# Patient Record
Sex: FEMALE | Race: WHITE
[De-identification: names, ages, dates, MRNs, and addresses within clinical notes are randomized per-mention and may not be internally consistent; named-entity substitution may affect disease eponyms.]

---

## 2017-06-07 ENCOUNTER — HOSPITAL ENCOUNTER (OUTPATIENT)
Dept: HOSPITAL 75 - RAD | Age: 82
End: 2017-06-07
Attending: PHYSICIAN ASSISTANT
Payer: MEDICARE

## 2017-06-07 DIAGNOSIS — Z12.31: Primary | ICD-10-CM

## 2017-06-07 PROCEDURE — 77067 SCR MAMMO BI INCL CAD: CPT

## 2017-06-07 NOTE — DIAGNOSTIC IMAGING REPORT
Bilateral screening mammogram



The current study was also evaluated with a Computer Aided

Detection (CAD) system.



Indication: Screening. No current complaints stated on the

questionnaire.



COMPARISON: 4/7/16



FINDINGS:

The breasts are composed of scattered fibroglandular densities.

There are  scattered benign-appearing calcifications. Allowing

for technique and positional differences, no suspicious change is

seen.



IMPRESSION: No significant change.



ACR BI-RADS Category 2: Benign findings.

Result letter will be mailed to the patient.

Note: At least 10% of breast cancer is not imaged by mammography.





Dictated by: 



  Dictated on workstation # [id number]

## 2018-08-27 ENCOUNTER — HOSPITAL ENCOUNTER (INPATIENT)
Dept: HOSPITAL 75 - ER | Age: 83
LOS: 9 days | DRG: 871 | End: 2018-09-05
Attending: FAMILY MEDICINE | Admitting: FAMILY MEDICINE
Payer: MEDICARE

## 2018-08-27 VITALS — SYSTOLIC BLOOD PRESSURE: 138 MMHG | DIASTOLIC BLOOD PRESSURE: 82 MMHG

## 2018-08-27 VITALS — DIASTOLIC BLOOD PRESSURE: 93 MMHG | SYSTOLIC BLOOD PRESSURE: 143 MMHG

## 2018-08-27 VITALS — SYSTOLIC BLOOD PRESSURE: 141 MMHG | DIASTOLIC BLOOD PRESSURE: 72 MMHG

## 2018-08-27 VITALS — HEIGHT: 65 IN | WEIGHT: 148 LBS | BODY MASS INDEX: 24.66 KG/M2

## 2018-08-27 VITALS — SYSTOLIC BLOOD PRESSURE: 144 MMHG | DIASTOLIC BLOOD PRESSURE: 74 MMHG

## 2018-08-27 VITALS — DIASTOLIC BLOOD PRESSURE: 60 MMHG | SYSTOLIC BLOOD PRESSURE: 145 MMHG

## 2018-08-27 VITALS — DIASTOLIC BLOOD PRESSURE: 69 MMHG | SYSTOLIC BLOOD PRESSURE: 128 MMHG

## 2018-08-27 VITALS — SYSTOLIC BLOOD PRESSURE: 133 MMHG | DIASTOLIC BLOOD PRESSURE: 63 MMHG

## 2018-08-27 VITALS — SYSTOLIC BLOOD PRESSURE: 137 MMHG | DIASTOLIC BLOOD PRESSURE: 81 MMHG

## 2018-08-27 VITALS — SYSTOLIC BLOOD PRESSURE: 135 MMHG | DIASTOLIC BLOOD PRESSURE: 72 MMHG

## 2018-08-27 VITALS — SYSTOLIC BLOOD PRESSURE: 140 MMHG | DIASTOLIC BLOOD PRESSURE: 70 MMHG

## 2018-08-27 VITALS — DIASTOLIC BLOOD PRESSURE: 72 MMHG | SYSTOLIC BLOOD PRESSURE: 128 MMHG

## 2018-08-27 VITALS — SYSTOLIC BLOOD PRESSURE: 135 MMHG | DIASTOLIC BLOOD PRESSURE: 67 MMHG

## 2018-08-27 DIAGNOSIS — D63.8: ICD-10-CM

## 2018-08-27 DIAGNOSIS — J81.1: ICD-10-CM

## 2018-08-27 DIAGNOSIS — Z66: ICD-10-CM

## 2018-08-27 DIAGNOSIS — E83.42: ICD-10-CM
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
## 2018-08-27 DIAGNOSIS — J18.9: ICD-10-CM

## 2018-08-27 DIAGNOSIS — L97.229: ICD-10-CM

## 2018-08-27 DIAGNOSIS — R60.0: ICD-10-CM

## 2018-08-27 DIAGNOSIS — R91.8: ICD-10-CM

## 2018-08-27 DIAGNOSIS — L89.159: ICD-10-CM

## 2018-08-27 DIAGNOSIS — R17: ICD-10-CM

## 2018-08-27 DIAGNOSIS — E11.9: ICD-10-CM
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
## 2018-08-27 DIAGNOSIS — I87.2: ICD-10-CM

## 2018-08-27 DIAGNOSIS — A41.1: Primary | ICD-10-CM

## 2018-08-27 DIAGNOSIS — J96.90: ICD-10-CM

## 2018-08-27 DIAGNOSIS — R65.21: ICD-10-CM

## 2018-08-27 DIAGNOSIS — Z86.69: ICD-10-CM

## 2018-08-27 DIAGNOSIS — R15.9: ICD-10-CM

## 2018-08-27 DIAGNOSIS — E43: ICD-10-CM

## 2018-08-27 DIAGNOSIS — S51.812A: ICD-10-CM

## 2018-08-27 DIAGNOSIS — D50.9: ICD-10-CM

## 2018-08-27 DIAGNOSIS — S81.802A: ICD-10-CM

## 2018-08-27 DIAGNOSIS — I10: ICD-10-CM

## 2018-08-27 DIAGNOSIS — R79.1: ICD-10-CM

## 2018-08-27 DIAGNOSIS — M54.5: ICD-10-CM

## 2018-08-27 DIAGNOSIS — I26.99: ICD-10-CM

## 2018-08-27 DIAGNOSIS — Z51.5: ICD-10-CM

## 2018-08-27 DIAGNOSIS — R32: ICD-10-CM

## 2018-08-27 DIAGNOSIS — E87.6: ICD-10-CM

## 2018-08-27 LAB
%HYPO/RBC NFR BLD AUTO: (no result) %
ALBUMIN SERPL-MCNC: 1.7 GM/DL (ref 3.2–4.5)
ALBUMIN SERPL-MCNC: 1.8 GM/DL (ref 3.2–4.5)
ALP SERPL-CCNC: 73 U/L (ref 40–136)
ALP SERPL-CCNC: 84 U/L (ref 40–136)
ALT SERPL-CCNC: 6 U/L (ref 0–55)
ALT SERPL-CCNC: 8 U/L (ref 0–55)
ANISOCYTOSIS BLD QL SMEAR: (no result)
APTT BLD: 27 SEC (ref 24–35)
APTT PPP: YELLOW S
BACTERIA #/AREA URNS HPF: NEGATIVE /HPF
BASOPHILS # BLD AUTO: 0 10^3/UL (ref 0–0.1)
BASOPHILS NFR BLD AUTO: 0 % (ref 0–10)
BASOPHILS NFR BLD MANUAL: 0 %
BILIRUB SERPL-MCNC: 1.4 MG/DL (ref 0.1–1)
BILIRUB SERPL-MCNC: 1.5 MG/DL (ref 0.1–1)
BILIRUB UR QL STRIP: (no result)
BUN/CREAT SERPL: 18
BUN/CREAT SERPL: 20
CALCIUM SERPL-MCNC: 6.9 MG/DL (ref 8.5–10.1)
CALCIUM SERPL-MCNC: 7.8 MG/DL (ref 8.5–10.1)
CHLORIDE SERPL-SCNC: 102 MMOL/L (ref 98–107)
CHLORIDE SERPL-SCNC: 97 MMOL/L (ref 98–107)
CO2 SERPL-SCNC: 34 MMOL/L (ref 21–32)
CO2 SERPL-SCNC: 34 MMOL/L (ref 21–32)
CREAT SERPL-MCNC: 0.79 MG/DL (ref 0.6–1.3)
CREAT SERPL-MCNC: 0.96 MG/DL (ref 0.6–1.3)
EOSINOPHIL # BLD AUTO: 0 10^3/UL (ref 0–0.3)
EOSINOPHIL NFR BLD AUTO: 0 % (ref 0–10)
EOSINOPHIL NFR BLD MANUAL: 1 %
ERYTHROCYTE [DISTWIDTH] IN BLOOD BY AUTOMATED COUNT: 19.4 % (ref 10–14.5)
FIBRINOGEN PPP-MCNC: (no result) MG/DL
GFR SERPLBLD BASED ON 1.73 SQ M-ARVRAT: 56 ML/MIN
GFR SERPLBLD BASED ON 1.73 SQ M-ARVRAT: > 60 ML/MIN
GLUCOSE SERPL-MCNC: 179 MG/DL (ref 70–105)
GLUCOSE SERPL-MCNC: 189 MG/DL (ref 70–105)
GLUCOSE UR STRIP-MCNC: NEGATIVE MG/DL
HCT VFR BLD CALC: 29 % (ref 35–52)
HGB BLD-MCNC: 8.1 G/DL (ref 11.5–16)
INR PPP: 1.7 (ref 0.8–1.4)
KETONES UR QL STRIP: (no result)
LEUKOCYTE ESTERASE UR QL STRIP: (no result)
LYMPHOCYTES # BLD AUTO: 0.7 X 10^3 (ref 1–4)
LYMPHOCYTES NFR BLD AUTO: 5 % (ref 12–44)
MAGNESIUM SERPL-MCNC: 1.1 MG/DL (ref 1.8–2.4)
MANUAL DIFFERENTIAL PERFORMED BLD QL: YES
MCH RBC QN AUTO: 23 PG (ref 25–34)
MCHC RBC AUTO-ENTMCNC: 28 G/DL (ref 32–36)
MCV RBC AUTO: 82 FL (ref 80–99)
MONOCYTES # BLD AUTO: 0.8 X 10^3 (ref 0–1)
MONOCYTES NFR BLD AUTO: 5 % (ref 0–12)
MONOCYTES NFR BLD: 2 %
NEUTROPHILS # BLD AUTO: 12.7 X 10^3 (ref 1.8–7.8)
NEUTROPHILS NFR BLD AUTO: 90 % (ref 42–75)
NEUTS BAND NFR BLD MANUAL: 90 %
NEUTS BAND NFR BLD: 3 %
NITRITE UR QL STRIP: NEGATIVE
PH UR STRIP: 6 [PH] (ref 5–9)
PLATELET # BLD: 382 10^3/UL (ref 130–400)
PMV BLD AUTO: 9.1 FL (ref 7.4–10.4)
POTASSIUM SERPL-SCNC: 2 MMOL/L (ref 3.6–5)
POTASSIUM SERPL-SCNC: 2.3 MMOL/L (ref 3.6–5)
PROT SERPL-MCNC: 5 GM/DL (ref 6.4–8.2)
PROT SERPL-MCNC: 5.6 GM/DL (ref 6.4–8.2)
PROT UR QL STRIP: (no result)
PROTHROMBIN TIME: 19.8 SEC (ref 12.2–14.7)
RBC # BLD AUTO: 3.48 10^6/UL (ref 4.35–5.85)
RBC #/AREA URNS HPF: (no result) /HPF
SODIUM SERPL-SCNC: 145 MMOL/L (ref 135–145)
SODIUM SERPL-SCNC: 146 MMOL/L (ref 135–145)
SP GR UR STRIP: 1.01 (ref 1.02–1.02)
SPHEROCYTES BLD QL SMEAR: SLIGHT
SQUAMOUS #/AREA URNS HPF: (no result) /HPF
UROBILINOGEN UR-MCNC: 4 MG/DL
VARIANT LYMPHS NFR BLD MANUAL: 4 %
WBC # BLD AUTO: 14.2 10^3/UL (ref 4.3–11)
WBC #/AREA URNS HPF: (no result) /HPF

## 2018-08-27 PROCEDURE — 82962 GLUCOSE BLOOD TEST: CPT

## 2018-08-27 PROCEDURE — 71045 X-RAY EXAM CHEST 1 VIEW: CPT

## 2018-08-27 PROCEDURE — 84443 ASSAY THYROID STIM HORMONE: CPT

## 2018-08-27 PROCEDURE — 80053 COMPREHEN METABOLIC PANEL: CPT

## 2018-08-27 PROCEDURE — 93005 ELECTROCARDIOGRAM TRACING: CPT

## 2018-08-27 PROCEDURE — 81000 URINALYSIS NONAUTO W/SCOPE: CPT

## 2018-08-27 PROCEDURE — 71260 CT THORAX DX C+: CPT

## 2018-08-27 PROCEDURE — 86901 BLOOD TYPING SEROLOGIC RH(D): CPT

## 2018-08-27 PROCEDURE — 84100 ASSAY OF PHOSPHORUS: CPT

## 2018-08-27 PROCEDURE — 85007 BL SMEAR W/DIFF WBC COUNT: CPT

## 2018-08-27 PROCEDURE — 83735 ASSAY OF MAGNESIUM: CPT

## 2018-08-27 PROCEDURE — 84132 ASSAY OF SERUM POTASSIUM: CPT

## 2018-08-27 PROCEDURE — 02HV33Z INSERTION OF INFUSION DEVICE INTO SUPERIOR VENA CAVA, PERCUTANEOUS APPROACH: ICD-10-PCS | Performed by: NURSE PRACTITIONER

## 2018-08-27 PROCEDURE — 85025 COMPLETE CBC W/AUTO DIFF WBC: CPT

## 2018-08-27 PROCEDURE — 82805 BLOOD GASES W/O2 SATURATION: CPT

## 2018-08-27 PROCEDURE — 86850 RBC ANTIBODY SCREEN: CPT

## 2018-08-27 PROCEDURE — 74019 RADEX ABDOMEN 2 VIEWS: CPT

## 2018-08-27 PROCEDURE — 93306 TTE W/DOPPLER COMPLETE: CPT

## 2018-08-27 PROCEDURE — 83036 HEMOGLOBIN GLYCOSYLATED A1C: CPT

## 2018-08-27 PROCEDURE — 85379 FIBRIN DEGRADATION QUANT: CPT

## 2018-08-27 PROCEDURE — 87088 URINE BACTERIA CULTURE: CPT

## 2018-08-27 PROCEDURE — 86900 BLOOD TYPING SEROLOGIC ABO: CPT

## 2018-08-27 PROCEDURE — 94640 AIRWAY INHALATION TREATMENT: CPT

## 2018-08-27 PROCEDURE — 84484 ASSAY OF TROPONIN QUANT: CPT

## 2018-08-27 PROCEDURE — 94660 CPAP INITIATION&MGMT: CPT

## 2018-08-27 PROCEDURE — 83880 ASSAY OF NATRIURETIC PEPTIDE: CPT

## 2018-08-27 PROCEDURE — 86920 COMPATIBILITY TEST SPIN: CPT

## 2018-08-27 PROCEDURE — 87081 CULTURE SCREEN ONLY: CPT

## 2018-08-27 PROCEDURE — 87077 CULTURE AEROBIC IDENTIFY: CPT

## 2018-08-27 PROCEDURE — 85610 PROTHROMBIN TIME: CPT

## 2018-08-27 PROCEDURE — 83605 ASSAY OF LACTIC ACID: CPT

## 2018-08-27 PROCEDURE — 93970 EXTREMITY STUDY: CPT

## 2018-08-27 PROCEDURE — 96365 THER/PROPH/DIAG IV INF INIT: CPT

## 2018-08-27 PROCEDURE — 85027 COMPLETE CBC AUTOMATED: CPT

## 2018-08-27 PROCEDURE — 71275 CT ANGIOGRAPHY CHEST: CPT

## 2018-08-27 PROCEDURE — 85730 THROMBOPLASTIN TIME PARTIAL: CPT

## 2018-08-27 PROCEDURE — 36415 COLL VENOUS BLD VENIPUNCTURE: CPT

## 2018-08-27 PROCEDURE — 80202 ASSAY OF VANCOMYCIN: CPT

## 2018-08-27 PROCEDURE — 87186 SC STD MICRODIL/AGAR DIL: CPT

## 2018-08-27 PROCEDURE — 87040 BLOOD CULTURE FOR BACTERIA: CPT

## 2018-08-27 PROCEDURE — 94760 N-INVAS EAR/PLS OXIMETRY 1: CPT

## 2018-08-27 PROCEDURE — 82728 ASSAY OF FERRITIN: CPT

## 2018-08-27 PROCEDURE — 83540 ASSAY OF IRON: CPT

## 2018-08-27 RX ADMIN — POTASSIUM CHLORIDE SCH MLS/HR: 200 INJECTION, SOLUTION INTRAVENOUS at 19:40

## 2018-08-27 RX ADMIN — SODIUM CHLORIDE, SODIUM LACTATE, POTASSIUM CHLORIDE, AND CALCIUM CHLORIDE SCH MLS/HR: 600; 310; 30; 20 INJECTION, SOLUTION INTRAVENOUS at 17:53

## 2018-08-27 RX ADMIN — POTASSIUM CHLORIDE SCH MLS/HR: 200 INJECTION, SOLUTION INTRAVENOUS at 20:43

## 2018-08-27 RX ADMIN — POTASSIUM CHLORIDE SCH MLS/HR: 200 INJECTION, SOLUTION INTRAVENOUS at 23:14

## 2018-08-27 RX ADMIN — POTASSIUM CHLORIDE SCH MLS/HR: 200 INJECTION, SOLUTION INTRAVENOUS at 21:45

## 2018-08-27 RX ADMIN — POTASSIUM CHLORIDE SCH MLS/HR: 200 INJECTION, SOLUTION INTRAVENOUS at 18:26

## 2018-08-27 RX ADMIN — MAGNESIUM SULFATE IN DEXTROSE SCH MLS/HR: 10 INJECTION, SOLUTION INTRAVENOUS at 19:40

## 2018-08-27 RX ADMIN — MAGNESIUM SULFATE IN DEXTROSE SCH MLS/HR: 10 INJECTION, SOLUTION INTRAVENOUS at 18:45

## 2018-08-27 RX ADMIN — MAGNESIUM SULFATE IN DEXTROSE SCH MLS/HR: 10 INJECTION, SOLUTION INTRAVENOUS at 21:44

## 2018-08-27 RX ADMIN — ALBUTEROL SULFATE SCH MG: 2.5 SOLUTION RESPIRATORY (INHALATION) at 21:59

## 2018-08-27 RX ADMIN — MAGNESIUM SULFATE IN DEXTROSE SCH MLS/HR: 10 INJECTION, SOLUTION INTRAVENOUS at 20:44

## 2018-08-27 NOTE — DIAGNOSTIC IMAGING REPORT
INDICATION: Shortness of breath and fatigue and weakness.



Frontal chest obtained at 01:23 p.m.



Heart is normal in size. There are chronic-appearing increased

interstitial markings. There is fullness in the left hilum, would

consider chest CT with contrast to exclude underlying mass. There

is no pneumothorax or pleural fluid.



IMPRESSION: Fullness in left hilum, underlying mass cannot be

excluded. Recommend CT chest for further evaluation. There are

chronic-appearing increased interstitial markings.



Dictated by: 



  Dictated on workstation # [id number]

## 2018-08-27 NOTE — ED GENERAL
General


Chief Complaint:  General Problems/Pain


Stated Complaint:  AFIB WITH RVR,GEN WEAKNESS


Nursing Triage Note:  


PT TO [facility] BY [facility] WITH CC OF FEVER AND GENERAL WEAKNESS.  PT WAS 


INCONTENANT, FOUND IN BED BY RACHEL TODAY.  RACHEL STATES PT FELL THIS A.M. 


GETTING A SKIN TEAR ON RT ARM.  DENIES HITTING HER HEAD, NO LOC.


Nursing Sepsis Screen:  Possible Sepsis Risk


Source of Information:  Patient, EMS


Exam Limitations:  No Limitations


 (ELLE GAINES MD)





History of Present Illness


Date Seen by Provider:  Aug 27, 2018


Time Seen by Provider:  13:00


Initial Comments


Here by EMS with report of fever and weakness.  Patient stays at [facility].  

Patient was incontinent of urine and stool.  Apparently fell this morning and 

had a skin tear to the right arm.  No report of hitting her head or loss of 

consciousness.  Patient is generally very weak and does have fever.  Denies 

shortness of breath does have cough.


Timing/Duration:  12 Hours, Getting Worse


Severity:  Moderate, Severe


Associated Systoms:  No Chest Pain; Cough, Fever/Chills; No Nausea/Vomiting, No 

Shortness of Air; Weakness (ELLE GAINES MD)





Allergies and Home Medications


Allergies


Coded Allergies:  


     No Known Drug Allergies (Unverified , 18)





Patient Home Medication List


Home Medication List Reviewed:  Yes


 (ELLE GAINES MD)


Home Medication List Reviewed:  Yes


 (OLEGARIO COSTA)





Review of Systems


Review of Systems


Constitutional:  see HPI; No chills; fever, malaise, weakness


EENTM:  no symptoms reported


Respiratory:  cough; No short of breath


Cardiovascular:  No chest pain; edema


Gastrointestinal:  No abdominal pain, No nausea, No vomiting; other (

incontinent of stool)


Genitourinary:  No dysuria; incontinence, other (foul-smelling urine)


Musculoskeletal:  No back pain; muscle weakness


Skin:  lesions (left lower extremity and right arm)


Psychiatric/Neurological:  Denies Headache; Weakness (ELLE GAINES MD)





All Other Systems Reviewed


Negative Unless Noted:  Yes


 (ELLE GAINES MD)





Past Medical-Social-Family Hx


Past Med/Social Hx:  Reviewed Nursing Past Med/Soc Hx


 (ELLE GAINES MD)





Patient Social History


Alcohol Use:  Denies Use


Recreational Drug Use:  No


Smoking Status:  Never a Smoker


Recent Foreign Travel:  No


Contact w/Someone Who Travel:  No


Recent Infectious Disease Expo:  No


Recent Hopitalizations:  No


 (ELLE GAINES MD)





Seasonal Allergies


Seasonal Allergies:  No


 (ELLE GAINES MD)





Past Medical History


Surgeries:  Yes ([facility] HIP)


Eye Surgery, Gallbladder, Hysterectomy, Orthopedic


Respiratory:  No


Cardiac:  Yes


Hypertension


Genitourinary:  No


Gastrointestinal:  No


Musculoskeletal:  Yes


Endocrine:  No


HEENT:  Yes


Cataract


Cancer:  No


Psychosocial:  No


Integumentary:  No


 (ELLE GAINES MD)





Family Medical History


Reviewed Nursing Family Hx


 (ELLE GAINES MD)


No Pertinent Family Hx


 (ELLE GAINES MD)





Physical Exam-Suspected Sepsis


Physical Exam


Vital Signs





Vital Signs - First Documented








 18





 16:10


 


Resp 20





 (OLGEARIO COSTA)


Vital Signs


Capillary Refill : Less Than 3 Seconds 


 (ELLE GAINES MD)








Blood Pressure Mean:  86








Height, Weight, BMI


Height: 5'5.00"


Weight: 140lbs. oz. 63.924063br;  BMI


Method:Stated


General Appearance:  Mild Distress, Thin


HEENT:  PERRL/EOMI, Pharynx Normal


Neck:  Non Tender, Supple


Respiratory:  No Respiratory Distress, Crackles


Cardiovascular:  No Murmur, Tachycardia


Gastrointestinal:  Non Tender, Soft


Back:  Normal Inspection, No CVA Tenderness, No Vertebral Tenderness


Extremity:  Normal Capillary Refill, Pedal Edema (2+ to the mid tibia bilateral)


Neurologic/Psychiatric:  Alert, Motor Weakness (global)


Skin:  normal color, other (skin breakdown to the buttocks.  Incontinent of 

urine and stool and noted on legs and groin area.  Skin tear to the right 

forearm and healing wound to the left lower extremity.  Both of these covered 

with dressings.) (ELLE GAINES MD)





Focused Exam


Lactate Level


18 13:00: Lactic Acid Level 5.05*H


18 15:05: Lactic Acid Level 2.29*H


18 18:05: Lactic Acid Level 1.38


 (OLEGARIO COSTA)


Lactic Acid Level


 (OLEGARIO COSTA)





Procedures/Interventions


Lumen:  triple


Central Line Procedure:  betadine prep, sterile drapes applied, sterile 

dressing applied


Position:  internal jugular (R)


Anesthesia:  Lidocaine


Volume Anesthetic (ccs):  3


Complications:  none


Post Position:  sutured


 (OLEGARIO COSTA)





Progress/Results/Core Measures


Suspected Sepsis


Recent Fever Within 48 Hours:  Yes


Infection Criteria Present:  Suspected New Infection


New/Unexplained  Altered Menta:  No


Sepsis Screen:  Possible Sepsis Risk


SIRS


Temperature:100.2 


Pulse: 93 


Respiratory Rate: 


 


Laboratory Tests


18 13:00: White Blood Count 14.2H


Blood Pressure 124 /68 


Mean: 86


 


18 13:00: Lactic Acid Level 5.05*H


18 15:05: 


Laboratory Tests


18 13:00: 


Creatinine 0.96, INR Comment 1.7H, Platelet Count 382, Total Bilirubin 1.5H


 (ELLE GAINES MD)





Results/Orders


Lab Results





Laboratory Tests








Test


 18


13:00 18


13:15 18


15:05 18


16:36 Range/Units


 


 


White Blood Count


 14.2 H


 


 


 


 4.3-11.0


10^3/uL


 


Red Blood Count


 3.48 L


 


 


 


 4.35-5.85


10^6/uL


 


Hemoglobin 8.1 L    11.5-16.0  G/DL


 


Hematocrit 29 L    35-52  %


 


Mean Corpuscular Volume 82     80-99  FL


 


Mean Corpuscular Hemoglobin 23 L    25-34  PG


 


Mean Corpuscular Hemoglobin


Concent 28 L


 


 


 


 32-36  G/DL





 


Red Cell Distribution Width 19.4 H    10.0-14.5  %


 


Platelet Count


 382 


 


 


 


 130-400


10^3/uL


 


Mean Platelet Volume 9.1     7.4-10.4  FL


 


Neutrophils (%) (Auto) 90 H    42-75  %


 


Lymphocytes (%) (Auto) 5 L    12-44  %


 


Monocytes (%) (Auto) 5     0-12  %


 


Eosinophils (%) (Auto) 0     0-10  %


 


Basophils (%) (Auto) 0     0-10  %


 


Neutrophils # (Auto) 12.7 H    1.8-7.8  X 10^3


 


Lymphocytes # (Auto) 0.7 L    1.0-4.0  X 10^3


 


Monocytes # (Auto) 0.8     0.0-1.0  X 10^3


 


Eosinophils # (Auto)


 0.0 


 


 


 


 0.0-0.3


10^3/uL


 


Basophils # (Auto)


 0.0 


 


 


 


 0.0-0.1


10^3/uL


 


Neutrophils % (Manual) 90      %


 


Lymphocytes % (Manual) 4      %


 


Monocytes % (Manual) 2      %


 


Eosinophils % (Manual) 1      %


 


Basophils % (Manual) 0      %


 


Band Neutrophils 3      %


 


Hypochromasia MODERATE      


 


Anisocytosis MARKED      


 


Spherocytes SLIGHT      


 


Prothrombin Time 19.8 H    12.2-14.7  SEC


 


INR Comment 1.7 H    0.8-1.4  


 


Activated Partial


Thromboplast Time 27 


 


 


 


 24-35  SEC





 


Sodium Level 146 H    135-145  MMOL/L


 


Potassium Level 2.3 *L    3.6-5.0  MMOL/L


 


Chloride Level 97 L      MMOL/L


 


Carbon Dioxide Level 34 H    21-32  MMOL/L


 


Anion Gap 15 H    5-14  MMOL/L


 


Blood Urea Nitrogen 17     7-18  MG/DL


 


Creatinine


 0.96 


 


 


 


 0.60-1.30


MG/DL


 


Estimat Glomerular Filtration


Rate 56 


 


 


 


  





 


BUN/Creatinine Ratio 18      


 


Glucose Level 189 H      MG/DL


 


Lactic Acid Level


 5.05 *H


 


 2.29 *H


 


 0.50-2.00


MMOL/L


 


Calcium Level 7.8 L    8.5-10.1  MG/DL


 


Corrected Calcium 9.6     8.5-10.1  MG/DL


 


Total Bilirubin 1.5 H    0.1-1.0  MG/DL


 


Aspartate Amino Transf


(AST/SGOT) 13 


 


 


 


 5-34  U/L





 


Alanine Aminotransferase


(ALT/SGPT) 6 


 


 


 


 0-55  U/L





 


Alkaline Phosphatase 84       U/L


 


Total Protein 5.6 L    6.4-8.2  GM/DL


 


Albumin 1.8 L    3.2-4.5  GM/DL


 


Urine Color  YELLOW     


 


Urine Clarity


 


 SLIGHTLY


CLOUDY 


 


  





 


Urine pH  6    5-9  


 


Urine Specific Gravity  1.015 L   1.016-1.022  


 


Urine Protein  2+ H   NEGATIVE  


 


Urine Glucose (UA)  NEGATIVE    NEGATIVE  


 


Urine Ketones  1+ H   NEGATIVE  


 


Urine Nitrite  NEGATIVE    NEGATIVE  


 


Urine Bilirubin  1+ H   NEGATIVE  


 


Urine Urobilinogen  4 H   NORMAL  MG/DL


 


Urine Leukocyte Esterase  1+ H   NEGATIVE  


 


Urine RBC (Auto)  4+ H   NEGATIVE  


 


Urine RBC  0-2     /HPF


 


Urine WBC  RARE     /HPF


 


Urine Squamous Epithelial


Cells 


 2-5 


 


 


  /HPF





 


Urine Crystals  NONE     /LPF


 


Urine Bacteria  NEGATIVE     /HPF


 


Urine Casts  NONE     /LPF


 


Urine Mucus  SMALL H    /LPF


 


Urine Culture Indicated  NO     


 


Glucometer    174 H   MG/DL


 


Test


 18


17:15 18


18:05 


 


 Range/Units


 


 


Sodium Level 145     135-145  MMOL/L


 


Potassium Level 2.0 *L    3.6-5.0  MMOL/L


 


Chloride Level 102       MMOL/L


 


Carbon Dioxide Level 34 H    21-32  MMOL/L


 


Anion Gap 9     5-14  MMOL/L


 


Blood Urea Nitrogen 16     7-18  MG/DL


 


Creatinine


 0.79 


 


 


 


 0.60-1.30


MG/DL


 


Estimat Glomerular Filtration


Rate > 60 


 


 


 


  





 


BUN/Creatinine Ratio 20      


 


Glucose Level 179 H      MG/DL


 


Calcium Level 6.9 L    8.5-10.1  MG/DL


 


Corrected Calcium 8.7     8.5-10.1  MG/DL


 


Magnesium Level 1.1 L    1.8-2.4  MG/DL


 


Total Bilirubin 1.4 H    0.1-1.0  MG/DL


 


Aspartate Amino Transf


(AST/SGOT) 14 


 


 


 


 5-34  U/L





 


Alanine Aminotransferase


(ALT/SGPT) 8 


 


 


 


 0-55  U/L





 


Alkaline Phosphatase 73       U/L


 


Total Protein 5.0 L    6.4-8.2  GM/DL


 


Albumin 1.7 L    3.2-4.5  GM/DL


 


Lactic Acid Level


 


 1.38 


 


 


 0.50-2.00


MMOL/L





 (OLEGARIO COSTA)


Micro Results





Microbiology


18 Urine Culture - Preliminary, Resulted


          Sent To Rml


 (OLEGARIO COSTA)


Medications Given in ED





Current Medications








 Medications  Dose


 Ordered  Sig/Martha


 Route  Start Time


 Stop Time Status Last Admin


Dose Admin


 


 Acetaminophen  1,000 mg  ONCE  PRN


 PO  18 13:15


 18 13:31 DC 18 13:30


1,000 MG


 


 Iohexol  75 ml  ONCE  ONCE


 IV  18 14:30


 18 14:31 DC 18 14:50


75 ML


 


 Piperacillin Sod/


 Tazobactam Sod


 4.5 gm/Dextrose  100 ml @ 


 200 mls/hr  ONCE  ONCE


 IV  18 14:15


 18 14:44 DC 18 15:05


200 MLS/HR


 


 Sodium Chloride  250 ml  ONCE  ONCE


 IV  18 14:30


 18 14:31 DC 18 14:50


80 ML


 


 Sodium Chloride  1,973.13 ml


  @ 1,973.13


 mls/hr  ONCE  ONCE


 IV  18 13:15


 18 14:14 DC 18 13:31


1,973.13 MLS/HR





 (OLEGARIO COSTA)


Vital Signs/I&O











 18





 12:55 12:55 13:30 16:10


 


Temp 100.2  100.2 96.0


 


Pulse 93   78


 


Resp    20


 


B/P (MAP) 124/68 (86)   129/65 (86)


 


Pulse Ox 98 98  98


 


O2 Delivery Nasal Cannula Nasal Cannula  Nasal Cannula


 


O2 Flow Rate 2.00 2.00  2.00


 


    





 18





 16:15 16:23 16:30 16:45


 


Temp 97.7   


 


Pulse 87 84  78


 


Resp 23   15


 


B/P (MAP) 143/93 (110)   145/60 (88)


 


Pulse Ox 95  95 93


 


O2 Delivery Room Air  Room Air Room Air


 


    





 18





 17:00 17:15 17:30 17:45


 


Pulse 82 73 85 76


 


Resp 15 31 10 15


 


B/P (MAP) 135/72 (93) 135/67 (89) 144/74 (97) 140/70 (93)


 


Pulse Ox 95 94 96 98


 


O2 Delivery Room Air Room Air Room Air Room Air





 18





 18:00 19:00 19:00 19:35


 


Pulse 78 84 97 81


 


Resp 21 14  


 


B/P (MAP) 141/72 (95) 137/81 (99)  


 


Pulse Ox 93 97  93


 


O2 Delivery Room Air Room Air  





 18





 20:00 20:00 20:00 20:04


 


Temp 97.1   


 


Pulse   91 


 


Resp   12 


 


B/P (MAP)   138/82 (100) 


 


Pulse Ox  95 100 


 


O2 Delivery  Room Air Room Air Nasal Cannula


 


O2 Flow Rate    2.00


 


    





 18  





 21:00 21:59  


 


Pulse 72   


 


Resp 14   


 


B/P (MAP) 128/69 (88)   


 


Pulse Ox 97 95  


 


O2 Delivery Room Air Room Air  





 (COSTA,PETER J APRN)


Vital Signs/I&O


Capillary Refill : Less Than 3 Seconds 


 (ELLE GAINES MD)








Blood Pressure Mean:  86








Progress Note :  


Progress Note


Seen and evaluated on arrival by EMS.  Patient ill-appearing.  Sepsis workup 

initiated including labs, blood cultures and lactic acid.  UA ordered as well..

  Normal saline 500 mL bolus from EMS ongoing.  Due to the appearance and 

concerns for sepsis, high volume fluid resuscitation initiated at 30 mL/kg.  

Lactic acid elevated at 5.05.  Fluids continuing.  Chest x-ray shows concerning 

mass to the left upper chest and CT ordered.  Given patient's septic shock 

condition, central line indicated and was placed as dictated above.  CT chest 

with contrast after.  Zosyn 4.5 g IV initiated.  1530: I did discuss the case 

with Dr. Gillette.  Patient does have septic shock findings with elevated lactic 

acid.  CT chest would indicate mass in the left upper chest.  Concerns for 

possible postobstructive pneumonia.  She accepts patient for admission to the 

ICU.  Discussed with patient and family who agree with plan.  1540: I attest a 

focused exam at this time. 


 (ELLE GAINES MD)





Diagnostic Imaging





   Diagonstic Imaging:  Xray


   Plain Films/CT/US/NM/MRI:  chest


Comments


 [facility].


 [place], [place]





NAME:   SACHIN FLOYD


MED REC#:   [id number]


ACCOUNT#:   [id number]


PT STATUS:   REG ER


:   1935


PHYSICIAN:   ELLE GAINES MD


ADMIT DATE:   18/ER


 ***Draft***


Date of Exam:18





CHEST 1 VIEW, AP/PA ONLY








INDICATION: Shortness of breath and fatigue and weakness.





Frontal chest obtained at 01:23 p.m.





Heart is normal in size. There are chronic-appearing increased


interstitial markings. There is fullness in the left hilum, would


consider chest CT with contrast to exclude underlying mass. There


is no pneumothorax or pleural fluid.





IMPRESSION: Fullness in left hilum, underlying mass cannot be


excluded. Recommend CT chest for further evaluation. There are


chronic-appearing increased interstitial markings.





  Dictated on workstation # [id number]








Dict:   18 1346


Trans:   18 1350


 [id number]





Interpreted by:     KEVIN DUBOIS MD


Electronically signed by:








   [facility]:  Xray


   Plain Films/CT/US/NM/MRI:  chest


Comments


 [facility].


 [place], [place]





NAME:   SACHIN FLOYD


MED REC#:   [id number]


ACCOUNT#:   [id number]


PT STATUS:   REG ER


:   1935


PHYSICIAN:   ELLE GAINES MD


ADMIT DATE:   18/ER


 ***Draft***


Date of Exam:18





CHEST 1 VIEW, AP/PA ONLY








INDICATION: Line placement





Frontal chest obtained at 242 hours p.m. and compared to same day


at 123 hours p.m.





Heart is normal in size. Fullness of the left hilum is again


noted, underlying mass not excluded. There are chronic appearing


increased interstitial markings with some basilar scarring in the


right side. There is a right IJ central catheter with tip


overlying the SVC right atrial junction. There is no pneumothorax


following line placement.





IMPRESSION:





Chronic interstitial changes with some parenchymal scarring in


right base. New central catheter seen with tip overlying SVC


distally. No pneumothorax. Fullness in left perihilar region,


consider chest CT to rule out underlying mass.





  Dictated on workstation # [id number]








Dict:   18 [id number]


Trans:   18 1452


[facility] [id number]





Interpreted by:     KEVIN DUBOIS MD


Electronically signed by:  


 (ELLE GAINES MD)





Departure


Communication (Admissions)


Time/Spoke to Admitting Phy:  15:30


 (ELLE GAINES MD)





Impression





 Primary Impression:  


 Septic shock


 Additional Impressions:  


 Mass of left lung


 Pneumonia involving left lung


 Qualified Codes:  J18.1 - Lobar pneumonia, unspecified organism


Disposition:   ADMITTED AS INPATIENT


Condition:  Stable





Admissions


Decision to Admit Reason:  Admit from ER (General)


Decision to Admit/Date:  Aug 27, 2018


Time/Decision to Admit Time:  15:30


 (ELLE GAINES MD)





Departure-Patient Inst.


Referrals:  


BUCK MELGOZA DO (PCP)


Primary Care Physician











ELLE GAINES MD Aug 27, 2018 13:57


OLEGARIO COSTA Aug 27, 2018 14:54

## 2018-08-27 NOTE — HISTORY & PHYSICIAL ([facility])
HPI


History of Present Illness:


84 yo F that presented to ER with a week of not feeling good. States that she 

had fatigue for the last 2 months with weight loss of 20#. States that she had 

chills and night sweats during this time. Denies any pain other then chronic 

pain in her low back. Couple day history of shortness of breath.





Patient and grandson are poor historians


Source:  patient, family (Grandson)


Date seen by provider:  Aug 27, 2018


Time Seen by Provider:  17:30


Attending Physician


Urszula Gillette MD


PCP


Josie Bennett DO


Consult





Date of Admission


Aug 27, 2018 at 15:35





Home Medications


Home Medications


Reviewed patient Home Medication Reconciliation performed by pharmacy 

medication reconciliations technician and/or nursing.


Patients Allergies have been reviewed.





Allergies


Coded Allergies:  


     No Known Drug Allergies (Unverified , 18)





PMH-Social-Family Hx


Patient Social History


Living Status:  Lives at home with grandson


Alcohol Use:  Denies Use


Recreational Drug Use:  No


Smoking Status:  Never a Smoker


Recent Foreign Travel:  No


Contact w/other who traveled:  No


Recent Hopitalizations:  No


Recent Infectious Disease Expo:  No


Physical Abuse Screen:  No


Sexual Abuse:  No





Immunizations Up To Date


Date of Pneumonia Vaccine:  Aug 27, 2013





Past Medical History





Diabetes


HTN


Polio





Family Medical History


Significant Family History:  No Pertinent Family Hx





Review of Systems ([facility])


Constitutional:  chills, weakness, weight loss


EENTM:  no symptoms reported


Respiratory:  dyspnea on exertion, short of breath


Cardiovascular:  no symptoms reported; No chest pain, No palpitations


Gastrointestinal:  No abdominal pain, No constipation, No diarrhea; loss of 

appetite; No nausea, No vomiting


Genitourinary:  no symptoms reported


Pregnant:  No


Musculoskeletal:  back pain (chronic)


Skin:  lesions, rash


Psychiatric/Neurological:  No Symptoms Reported





Reviewed Test Results


Reviewed Test Results


Lab





Laboratory Tests








Test


 18


13:00 18


13:15 18


15:05 18


16:36 Range/Units


 


 


White Blood Count


 14.2 H


 


 


 


 4.3-11.0


10^3/uL


 


Red Blood Count


 3.48 L


 


 


 


 4.35-5.85


10^6/uL


 


Hemoglobin 8.1 L    11.5-16.0  G/DL


 


Hematocrit 29 L    35-52  %


 


Mean Corpuscular Volume 82     80-99  FL


 


Mean Corpuscular Hemoglobin 23 L    25-34  PG


 


Mean Corpuscular Hemoglobin


Concent 28 L


 


 


 


 32-36  G/DL





 


Red Cell Distribution Width 19.4 H    10.0-14.5  %


 


Platelet Count


 382 


 


 


 


 130-400


10^3/uL


 


Mean Platelet Volume 9.1     7.4-10.4  FL


 


Neutrophils (%) (Auto) 90 H    42-75  %


 


Lymphocytes (%) (Auto) 5 L    12-44  %


 


Monocytes (%) (Auto) 5     0-12  %


 


Eosinophils (%) (Auto) 0     0-10  %


 


Basophils (%) (Auto) 0     0-10  %


 


Neutrophils # (Auto) 12.7 H    1.8-7.8  X 10^3


 


Lymphocytes # (Auto) 0.7 L    1.0-4.0  X 10^3


 


Monocytes # (Auto) 0.8     0.0-1.0  X 10^3


 


Eosinophils # (Auto)


 0.0 


 


 


 


 0.0-0.3


10^3/uL


 


Basophils # (Auto)


 0.0 


 


 


 


 0.0-0.1


10^3/uL


 


Neutrophils % (Manual) 90      %


 


Lymphocytes % (Manual) 4      %


 


Monocytes % (Manual) 2      %


 


Eosinophils % (Manual) 1      %


 


Basophils % (Manual) 0      %


 


Band Neutrophils 3      %


 


Hypochromasia MODERATE      


 


Anisocytosis MARKED      


 


Spherocytes SLIGHT      


 


Prothrombin Time 19.8 H    12.2-14.7  SEC


 


INR Comment 1.7 H    0.8-1.4  


 


Activated Partial


Thromboplast Time 27 


 


 


 


 24-35  SEC





 


Sodium Level 146 H    135-145  MMOL/L


 


Potassium Level 2.3 *L    3.6-5.0  MMOL/L


 


Chloride Level 97 L      MMOL/L


 


Carbon Dioxide Level 34 H    21-32  MMOL/L


 


Anion Gap 15 H    5-14  MMOL/L


 


Blood Urea Nitrogen 17     7-18  MG/DL


 


Creatinine


 0.96 


 


 


 


 0.60-1.30


MG/DL


 


Estimat Glomerular Filtration


Rate 56 


 


 


 


  





 


BUN/Creatinine Ratio 18      


 


Glucose Level 189 H      MG/DL


 


Lactic Acid Level


 5.05 *H


 


 2.29 *H


 


 0.50-2.00


MMOL/L


 


Calcium Level 7.8 L    8.5-10.1  MG/DL


 


Corrected Calcium 9.6     8.5-10.1  MG/DL


 


Total Bilirubin 1.5 H    0.1-1.0  MG/DL


 


Aspartate Amino Transf


(AST/SGOT) 13 


 


 


 


 5-34  U/L





 


Alanine Aminotransferase


(ALT/SGPT) 6 


 


 


 


 0-55  U/L





 


Alkaline Phosphatase 84       U/L


 


Total Protein 5.6 L    6.4-8.2  GM/DL


 


Albumin 1.8 L    3.2-4.5  GM/DL


 


Urine Color  YELLOW     


 


Urine Clarity


 


 SLIGHTLY


CLOUDY 


 


  





 


Urine pH  6    5-9  


 


Urine Specific Gravity  1.015 L   1.016-1.022  


 


Urine Protein  2+ H   NEGATIVE  


 


Urine Glucose (UA)  NEGATIVE    NEGATIVE  


 


Urine Ketones  1+ H   NEGATIVE  


 


Urine Nitrite  NEGATIVE    NEGATIVE  


 


Urine Bilirubin  1+ H   NEGATIVE  


 


Urine Urobilinogen  4 H   NORMAL  MG/DL


 


Urine Leukocyte Esterase  1+ H   NEGATIVE  


 


Urine RBC (Auto)  4+ H   NEGATIVE  


 


Urine RBC  0-2     /HPF


 


Urine WBC  RARE     /HPF


 


Urine Squamous Epithelial


Cells 


 2-5 


 


 


  /HPF





 


Urine Crystals  NONE     /LPF


 


Urine Bacteria  NEGATIVE     /HPF


 


Urine Casts  NONE     /LPF


 


Urine Mucus  SMALL H    /LPF


 


Urine Culture Indicated  NO     


 


Glucometer    174 H   MG/DL


 


Test


 18


17:15 18


18:05 


 


 Range/Units


 


 


Sodium Level 145     135-145  MMOL/L


 


Potassium Level 2.0 *L    3.6-5.0  MMOL/L


 


Chloride Level 102       MMOL/L


 


Carbon Dioxide Level 34 H    21-32  MMOL/L


 


Anion Gap 9     5-14  MMOL/L


 


Blood Urea Nitrogen 16     7-18  MG/DL


 


Creatinine


 0.79 


 


 


 


 0.60-1.30


MG/DL


 


Estimat Glomerular Filtration


Rate > 60 


 


 


 


  





 


BUN/Creatinine Ratio 20      


 


Glucose Level 179 H      MG/DL


 


Calcium Level 6.9 L    8.5-10.1  MG/DL


 


Corrected Calcium 8.7     8.5-10.1  MG/DL


 


Magnesium Level 1.1 L    1.8-2.4  MG/DL


 


Total Bilirubin 1.4 H    0.1-1.0  MG/DL


 


Aspartate Amino Transf


(AST/SGOT) 14 


 


 


 


 5-34  U/L





 


Alanine Aminotransferase


(ALT/SGPT) 8 


 


 


 


 0-55  U/L





 


Alkaline Phosphatase 73       U/L


 


Total Protein 5.0 L    6.4-8.2  GM/DL


 


Albumin 1.7 L    3.2-4.5  GM/DL








Radiology


Date of Exam:   18





CT CHEST W


 





PROCEDURE: CT chest with contrast only.





TECHNIQUE: Multiple contiguous axial images were obtained through


the chest after administration of intravenous contrast.





INDICATION: Cough, fever





COMPARISON: There are no previous CT chest examinations available


for comparison.





FINDINGS:  


The plain film examination of the chest performed earlier today


at 1:23 PM noted a fullness in the left hilum raising the


question of a neoplastic mass in this area. On this exam, there


is indeed a sizable 5.4 x 6.6 x 6.1 CM heterogeneous mass along


the medial aspect of the left upper lung. This mass should be


considered neoplastic until proven otherwise.





The heart is mildly enlarged. There are coronary calcifications


evident. The aorta is not abnormally dilated and there is no sign


of dissection. The pulmonary arteries were not well opacified and


consequently difficult to assess for a pulmonary embolus. 





There is no mediastinal or hilar adenopathy. There are chronic


pulmonary changes evident and there are coarse interstitial


densities about both carol ann and in both lung bases, particularly on


the right.





There is no obvious breast mass.





The sections through the upper abdomen fail to show any sign of


an acute abnormality. There are surgical clips about the


gallbladder fossa consistent with a prior cholecystectomy. There


appears to be a small collection of fluid in this area. This may


be fluid within the bowel as opposed to a free fluid collection.





Also, there is a 1.3 x 1.4 CM nodule associated with the medial


jeanine of the right adrenal gland. This may represent a benign


process. The possibility that this is secondary to metastatic


disease secondary to the large lung mass cannot be entirely


excluded however. If further imaging is desired, PET CT would be


recommended.





The bone windows show no sign of a fracture or of a destructive


lesion.





IMPRESSION:


1. There is a large mass along the medial aspect of the left


upper lobe. This should be considered neoplastic until proven


otherwise.


2. There is cardiomegaly and coronary artery disease and chronic


pulmonary disease. There is no acute cardiopulmonary abnormality


noted otherwise.


3. The nodule associated with the right adrenal gland may


represent a benign process. The possibility that this is


neoplastic in nature cannot be entirely excluded. Recommendations


as above.


4. The fluid collection in the right upper quadrant may be fluid


within the bowel. If further study is desired, then a complete CT


abdomen and pelvis exam should be obtained.





Physical Exam-([facility])


Physical Exam


Vital Signs





 VS - Last 72 Hours, by Label








 18





 12:55 12:55 13:30 16:10


 


Temp 100.2  100.2 96.0


 


Pulse 93   78


 


Resp    20


 


B/P (MAP) 124/68 (86)   129/65 (86)


 


Pulse Ox 98 98  98


 


O2 Delivery Nasal Cannula Nasal Cannula  Nasal Cannula


 


O2 Flow Rate 2.00 2.00  2.00


 


    





 18





 16:15 16:23 16:30 16:45


 


Temp 97.7   


 


Pulse 87 84  78


 


Resp 23   15


 


B/P (MAP) 143/93 (110)   145/60 (88)


 


Pulse Ox 95  95 93


 


O2 Delivery Room Air  Room Air Room Air


 


    





 18





 17:00 17:15 17:30 17:45


 


Pulse 82 73 85 76


 


Resp 15 31 10 15


 


B/P (MAP) 135/72 (93) 135/67 (89) 144/74 (97) 140/70 (93)


 


Pulse Ox 95 94 96 98


 


O2 Delivery Room Air Room Air Room Air Room Air





 18





 18:00 19:00 19:00 19:35


 


Pulse 78 84 97 81


 


Resp 21 14  


 


B/P (MAP) 141/72 (95) 137/81 (99)  


 


Pulse Ox 93 97  93


 


O2 Delivery Room Air Room Air  





 18 





 20:00 20:04 21:00 


 


Pulse 91  72 


 


Resp 12  14 


 


B/P (MAP) 138/82 (100)  128/69 (88) 


 


Pulse Ox 100  97 


 


O2 Delivery Room Air Nasal Cannula Room Air 


 


O2 Flow Rate  2.00  





Capillary Refill : Less Than 3 Seconds


General Appearance:  thin (ill appearing)


Respiratory:  lungs clear, normal breath sounds, no respiratory distress


Cardiovascular:  regular rate, rhythm, no murmur


Gastrointestinal:  non tender, soft, no organomegaly; No hepatomegaly, No 

spleenomegaly


Extremities:  no calf tenderness, pedal edema (3+ bilaterally)


Neurologic/Psychiatric:  alert, normal mood/affect, oriented x 3, motor weakness

, sensory deficit, other (unable to lift legs off bed)





Assessment/Plan


Assessment/Plan


Admission Status:  Inpatient Order (span 2 midnights)


Reason for Inpatient Admission:  


Requiring hourly care with IV antibioitics





(1) Septic shock


Status:  Acute


Assessment & Plan:  - Patient has completed 30 mg/kg fluids, VS stable, LA 

resolved


- Started on Vanc/zosyn, blood culture pending





(2) Pneumonia involving left lung


Status:  Acute


Assessment & Plan:  - Likely post obstructive 2/2 mass


- Will consult Ritika to see if patient is candidate for broch vs 

Interventional Rad for bx


Qualifiers:  


   Qualified Codes:  J18.1 - Lobar pneumonia, unspecified organism


(3) Mass of left lung


Status:  Acute


(4) Wound of left leg


Status:  Acute


Assessment & Plan:  - Consult wound care


- Concerned for poor vascular supply


Qualifiers:  


   Qualified Codes:  S81.802A - Unspecified open wound, left lower leg, initial 

encounter


(5) Localized swelling of both lower extremities


Status:  Chronic


(6) Decubital ulcer


Status:  Chronic


Assessment & Plan:  - Wound care


Qualifiers:  


   Qualified Codes:  L89.159 - Pressure ulcer of sacral region, unspecified 

stage


(7) Diabetes


Status:  Chronic


Assessment & Plan:  - SSI, A1c pending


Qualifiers:  


   Qualified Codes:  E11.9 - Type 2 diabetes mellitus without complications


(8) HTN (hypertension)


Status:  Chronic


Assessment & Plan:  - Will hold bp meds due to shock at this time, will 

continue to monitor


Qualifiers:  


   Qualified Codes:  I10 - Essential (primary) hypertension


(9) Severe protein-calorie malnutrition


Status:  Chronic


(10) Elevated INR


Status:  Acute


Assessment & Plan:  - will repeat in AM





(11) Elevated bilirubin


Status:  Acute


Assessment & Plan:  - Repeat in AM





(12) Normocytic anemia


Status:  Chronic


Assessment & Plan:  - Will add iron panel, repeat in AM





(13) Hypokalemia


Status:  Acute


Assessment & Plan:  - Replace and repeat





(14) Hypomagnesemia


Status:  Acute


Assessment & Plan:  - Replace and repeat in AM








Clinical Quality Measures


DVT/VTE Risk/Contraindication:


Risk Factor Score Per Nursin


RFS Level Per Nursing on Admit:  4+=Very High





Copy


Copies To 1:   URSZULA MENDEZ MD Aug 27, 2018 18:25

## 2018-08-27 NOTE — XMS REPORT
Continuity of Care Document

 Created on: 2018



SACHIN FLOYD

External Reference #: [id number]

: 1935

Sex: Female



Demographics







 Address  [address] [facility] [address]

[place]  [address]

 

 Home Phone  [phone] x

 

 Preferred Language  Unknown

 

 Marital Status  Unknown

 

 [employer] Affiliation  Unknown

 

 Race  Unknown

 

 Ethnic Group  Unknown





Author







 Author  [facility]

 

 Organization  [facility]

 

 Address  Unknown

 

 Phone  Unavailable



              



Allergies

      



There is no data.                  



Medications

      



There is no data.                  



Problems

      





 Date Dx Coded            Attending            Type            Code            
Diagnosis            Diagnosed By        

 

 2016            TIMO SAENZ            Ot            M81.0     
                             

 

 2016            TIMO SAENZ            Ot            Z12.31    
                              

 

 2017            TIMO SAENZ            Ot            M81.0     
       AGE-RELATED OSTEOPOROSIS W/O CURRENT PAT                     

 

 2017            TIMO SAENZ            Ot            Z12.31    
        ENCNTR SCREEN MAMMOGRAM FOR MALIGNANT NE                     

 

 2017            TIMO SAENZ            Ot            M81.0     
       AGE-RELATED OSTEOPOROSIS W/O CURRENT PAT                     

 

 2017            TIMO SAENZ            Ot            Z12.31    
        ENCNTR SCREEN MAMMOGRAM FOR MALIGNANT NE                     

 

 2017            TIMO SAENZ            Ot            Z12.31    
        ENCNTR SCREEN MAMMOGRAM FOR MALIGNANT NE                     

 

 2017            TIMO SAENZ            Ot            Z12.31    
        ENCNTR SCREEN MAMMOGRAM FOR MALIGNANT NE                     

 

 2017            TIMO SAENZ            Ot            Z12.31    
        ENCNTR SCREEN MAMMOGRAM FOR MALIGNANT NE                     



                                  



Procedures

      



There is no data.                  



Results

      



There is no data.              



Encounters

      





 ACCT No.            Visit Date/Time            Discharge            Status    
        Pt. Type            Provider            Facility            Loc./Unit  
          Complaint        

 

 [id number]            2017 10:17:00            2017 23:59:59    
        [facility]            Outpatient            TIMO SAENZ            
[facility]            RAD            SCREENING FOR BREAST 
[facility]        

 

 [id number]            2016 10:22:00            2016 23:59:59    
        [facility]            Outpatient            TIMO SAENZ            
[facility]            RAD            SCREENING,
OSTEOPOROSIS

## 2018-08-27 NOTE — DIAGNOSTIC IMAGING REPORT
PROCEDURE: CT chest with contrast only.



TECHNIQUE: Multiple contiguous axial images were obtained through

the chest after administration of intravenous contrast.



INDICATION: Cough, fever



COMPARISON: There are no previous CT chest examinations available

for comparison.



FINDINGS:  

The plain film examination of the chest performed earlier today

at 1:23 PM noted a fullness in the left hilum raising the

question of a neoplastic mass in this area. On this exam, there

is indeed a sizable 5.4 x 6.6 x 6.1 CM heterogeneous mass along

the medial aspect of the left upper lung. This mass should be

considered neoplastic until proven otherwise.



The heart is mildly enlarged. There are coronary calcifications

evident. The aorta is not abnormally dilated and there is no sign

of dissection. The pulmonary arteries were not well opacified and

consequently difficult to assess for a pulmonary embolus. 



There is no mediastinal or hilar adenopathy. There are chronic

pulmonary changes evident and there are coarse interstitial

densities about both carol ann and in both lung bases, particularly on

the right.



There is no obvious breast mass.



The sections through the upper abdomen fail to show any sign of

an acute abnormality. There are surgical clips about the

gallbladder fossa consistent with a prior cholecystectomy. There

appears to be a small collection of fluid in this area. This may

be fluid within the bowel as opposed to a free fluid collection.



Also, there is a 1.3 x 1.4 CM nodule associated with the medial

jeanine of the right adrenal gland. This may represent a benign

process. The possibility that this is secondary to metastatic

disease secondary to the large lung mass cannot be entirely

excluded however. If further imaging is desired, PET CT would be

recommended.



The bone windows show no sign of a fracture or of a destructive

lesion.



IMPRESSION:

1. There is a large mass along the medial aspect of the left

upper lobe. This should be considered neoplastic until proven

otherwise.

2. There is cardiomegaly and coronary artery disease and chronic

pulmonary disease. There is no acute cardiopulmonary abnormality

noted otherwise.

3. The nodule associated with the right adrenal gland may

represent a benign process. The possibility that this is

neoplastic in nature cannot be entirely excluded. Recommendations

as above.

4. The fluid collection in the right upper quadrant may be fluid

within the bowel. If further study is desired, then a complete CT

abdomen and pelvis exam should be obtained.



Dictated by: 



  Dictated on workstation # [id number]

## 2018-08-27 NOTE — DIAGNOSTIC IMAGING REPORT
INDICATION: Line placement



Frontal chest obtained at 242 hours p.m. and compared to same day

at 123 hours p.m.



Heart is normal in size. Fullness of the left hilum is again

noted, underlying mass not excluded. There are chronic appearing

increased interstitial markings with some basilar scarring in the

right side. There is a right IJ central catheter with tip

overlying the SVC right atrial junction. There is no pneumothorax

following line placement.



IMPRESSION:



Chronic interstitial changes with some parenchymal scarring in

right base. New central catheter seen with tip overlying SVC

distally. No pneumothorax. Fullness in left perihilar region,

consider chest CT to rule out underlying mass.



Dictated by: 



  Dictated on workstation # [id number]

## 2018-08-28 VITALS — DIASTOLIC BLOOD PRESSURE: 66 MMHG | SYSTOLIC BLOOD PRESSURE: 120 MMHG

## 2018-08-28 VITALS — SYSTOLIC BLOOD PRESSURE: 144 MMHG | DIASTOLIC BLOOD PRESSURE: 71 MMHG

## 2018-08-28 VITALS — SYSTOLIC BLOOD PRESSURE: 108 MMHG | DIASTOLIC BLOOD PRESSURE: 88 MMHG

## 2018-08-28 VITALS — SYSTOLIC BLOOD PRESSURE: 137 MMHG | DIASTOLIC BLOOD PRESSURE: 81 MMHG

## 2018-08-28 VITALS — SYSTOLIC BLOOD PRESSURE: 129 MMHG | DIASTOLIC BLOOD PRESSURE: 62 MMHG

## 2018-08-28 VITALS — DIASTOLIC BLOOD PRESSURE: 77 MMHG | SYSTOLIC BLOOD PRESSURE: 138 MMHG

## 2018-08-28 VITALS — DIASTOLIC BLOOD PRESSURE: 66 MMHG | SYSTOLIC BLOOD PRESSURE: 139 MMHG

## 2018-08-28 VITALS — DIASTOLIC BLOOD PRESSURE: 86 MMHG | SYSTOLIC BLOOD PRESSURE: 115 MMHG

## 2018-08-28 VITALS — DIASTOLIC BLOOD PRESSURE: 70 MMHG | SYSTOLIC BLOOD PRESSURE: 138 MMHG

## 2018-08-28 VITALS — DIASTOLIC BLOOD PRESSURE: 63 MMHG | SYSTOLIC BLOOD PRESSURE: 133 MMHG

## 2018-08-28 VITALS — DIASTOLIC BLOOD PRESSURE: 90 MMHG | SYSTOLIC BLOOD PRESSURE: 117 MMHG

## 2018-08-28 VITALS — SYSTOLIC BLOOD PRESSURE: 135 MMHG | DIASTOLIC BLOOD PRESSURE: 65 MMHG

## 2018-08-28 VITALS — SYSTOLIC BLOOD PRESSURE: 131 MMHG | DIASTOLIC BLOOD PRESSURE: 65 MMHG

## 2018-08-28 VITALS — SYSTOLIC BLOOD PRESSURE: 113 MMHG | DIASTOLIC BLOOD PRESSURE: 64 MMHG

## 2018-08-28 VITALS — DIASTOLIC BLOOD PRESSURE: 80 MMHG | SYSTOLIC BLOOD PRESSURE: 125 MMHG

## 2018-08-28 VITALS — DIASTOLIC BLOOD PRESSURE: 66 MMHG | SYSTOLIC BLOOD PRESSURE: 136 MMHG

## 2018-08-28 VITALS — DIASTOLIC BLOOD PRESSURE: 66 MMHG | SYSTOLIC BLOOD PRESSURE: 137 MMHG

## 2018-08-28 VITALS — DIASTOLIC BLOOD PRESSURE: 73 MMHG | SYSTOLIC BLOOD PRESSURE: 129 MMHG

## 2018-08-28 VITALS — DIASTOLIC BLOOD PRESSURE: 90 MMHG | SYSTOLIC BLOOD PRESSURE: 120 MMHG

## 2018-08-28 VITALS — SYSTOLIC BLOOD PRESSURE: 119 MMHG | DIASTOLIC BLOOD PRESSURE: 70 MMHG

## 2018-08-28 VITALS — DIASTOLIC BLOOD PRESSURE: 72 MMHG | SYSTOLIC BLOOD PRESSURE: 131 MMHG

## 2018-08-28 VITALS — DIASTOLIC BLOOD PRESSURE: 70 MMHG | SYSTOLIC BLOOD PRESSURE: 133 MMHG

## 2018-08-28 VITALS — SYSTOLIC BLOOD PRESSURE: 130 MMHG | DIASTOLIC BLOOD PRESSURE: 70 MMHG

## 2018-08-28 LAB
ALBUMIN SERPL-MCNC: 1.7 GM/DL (ref 3.2–4.5)
ALBUMIN SERPL-MCNC: 1.8 GM/DL (ref 3.2–4.5)
ALP SERPL-CCNC: 80 U/L (ref 40–136)
ALP SERPL-CCNC: 88 U/L (ref 40–136)
ALT SERPL-CCNC: 10 U/L (ref 0–55)
ALT SERPL-CCNC: 9 U/L (ref 0–55)
BASOPHILS # BLD AUTO: 0 10^3/UL (ref 0–0.1)
BASOPHILS # BLD AUTO: 0 10^3/UL (ref 0–0.1)
BASOPHILS NFR BLD AUTO: 0 % (ref 0–10)
BASOPHILS NFR BLD AUTO: 0 % (ref 0–10)
BILIRUB SERPL-MCNC: 0.6 MG/DL (ref 0.1–1)
BILIRUB SERPL-MCNC: 0.9 MG/DL (ref 0.1–1)
BUN/CREAT SERPL: 21
BUN/CREAT SERPL: 22
CALCIUM SERPL-MCNC: 7.3 MG/DL (ref 8.5–10.1)
CALCIUM SERPL-MCNC: 7.3 MG/DL (ref 8.5–10.1)
CHLORIDE SERPL-SCNC: 101 MMOL/L (ref 98–107)
CHLORIDE SERPL-SCNC: 104 MMOL/L (ref 98–107)
CO2 SERPL-SCNC: 26 MMOL/L (ref 21–32)
CO2 SERPL-SCNC: 29 MMOL/L (ref 21–32)
CREAT SERPL-MCNC: 0.79 MG/DL (ref 0.6–1.3)
CREAT SERPL-MCNC: 0.82 MG/DL (ref 0.6–1.3)
EOSINOPHIL # BLD AUTO: 0 10^3/UL (ref 0–0.3)
EOSINOPHIL # BLD AUTO: 0 10^3/UL (ref 0–0.3)
EOSINOPHIL NFR BLD AUTO: 0 % (ref 0–10)
EOSINOPHIL NFR BLD AUTO: 0 % (ref 0–10)
ERYTHROCYTE [DISTWIDTH] IN BLOOD BY AUTOMATED COUNT: 19.2 % (ref 10–14.5)
ERYTHROCYTE [DISTWIDTH] IN BLOOD BY AUTOMATED COUNT: 19.2 % (ref 10–14.5)
GFR SERPLBLD BASED ON 1.73 SQ M-ARVRAT: > 60 ML/MIN
GFR SERPLBLD BASED ON 1.73 SQ M-ARVRAT: > 60 ML/MIN
GLUCOSE SERPL-MCNC: 220 MG/DL (ref 70–105)
GLUCOSE SERPL-MCNC: 230 MG/DL (ref 70–105)
HCT VFR BLD CALC: 25 % (ref 35–52)
HCT VFR BLD CALC: 25 % (ref 35–52)
HGB BLD-MCNC: 7.2 G/DL (ref 11.5–16)
HGB BLD-MCNC: 7.4 G/DL (ref 11.5–16)
LYMPHOCYTES # BLD AUTO: 0.7 X 10^3 (ref 1–4)
LYMPHOCYTES # BLD AUTO: 0.9 X 10^3 (ref 1–4)
LYMPHOCYTES NFR BLD AUTO: 5 % (ref 12–44)
LYMPHOCYTES NFR BLD AUTO: 6 % (ref 12–44)
MAGNESIUM SERPL-MCNC: 2.6 MG/DL (ref 1.8–2.4)
MANUAL DIFFERENTIAL PERFORMED BLD QL: NO
MANUAL DIFFERENTIAL PERFORMED BLD QL: NO
MCH RBC QN AUTO: 24 PG (ref 25–34)
MCH RBC QN AUTO: 24 PG (ref 25–34)
MCHC RBC AUTO-ENTMCNC: 29 G/DL (ref 32–36)
MCHC RBC AUTO-ENTMCNC: 30 G/DL (ref 32–36)
MCV RBC AUTO: 81 FL (ref 80–99)
MCV RBC AUTO: 81 FL (ref 80–99)
MONOCYTES # BLD AUTO: 0.5 X 10^3 (ref 0–1)
MONOCYTES # BLD AUTO: 0.6 X 10^3 (ref 0–1)
MONOCYTES NFR BLD AUTO: 4 % (ref 0–12)
MONOCYTES NFR BLD AUTO: 4 % (ref 0–12)
NEUTROPHILS # BLD AUTO: 12.8 X 10^3 (ref 1.8–7.8)
NEUTROPHILS # BLD AUTO: 13.3 X 10^3 (ref 1.8–7.8)
NEUTROPHILS NFR BLD AUTO: 90 % (ref 42–75)
NEUTROPHILS NFR BLD AUTO: 91 % (ref 42–75)
PHOSPHATE SERPL-MCNC: 2.8 MG/DL (ref 2.3–4.7)
PLATELET # BLD: 277 10^3/UL (ref 130–400)
PLATELET # BLD: 284 10^3/UL (ref 130–400)
PMV BLD AUTO: 9.1 FL (ref 7.4–10.4)
PMV BLD AUTO: 9.4 FL (ref 7.4–10.4)
POTASSIUM SERPL-SCNC: 2.8 MMOL/L (ref 3.6–5)
POTASSIUM SERPL-SCNC: 3.9 MMOL/L (ref 3.6–5)
PROT SERPL-MCNC: 5.1 GM/DL (ref 6.4–8.2)
PROT SERPL-MCNC: 5.3 GM/DL (ref 6.4–8.2)
RBC # BLD AUTO: 3.05 10^6/UL (ref 4.35–5.85)
RBC # BLD AUTO: 3.11 10^6/UL (ref 4.35–5.85)
SODIUM SERPL-SCNC: 142 MMOL/L (ref 135–145)
SODIUM SERPL-SCNC: 143 MMOL/L (ref 135–145)
WBC # BLD AUTO: 14.2 10^3/UL (ref 4.3–11)
WBC # BLD AUTO: 14.5 10^3/UL (ref 4.3–11)

## 2018-08-28 RX ADMIN — SODIUM CHLORIDE, SODIUM LACTATE, POTASSIUM CHLORIDE, AND CALCIUM CHLORIDE SCH MLS/HR: 600; 310; 30; 20 INJECTION, SOLUTION INTRAVENOUS at 13:43

## 2018-08-28 RX ADMIN — POTASSIUM CHLORIDE SCH MEQ: 1500 TABLET, EXTENDED RELEASE ORAL at 04:09

## 2018-08-28 RX ADMIN — SODIUM CHLORIDE, SODIUM LACTATE, POTASSIUM CHLORIDE, AND CALCIUM CHLORIDE SCH MLS/HR: 600; 310; 30; 20 INJECTION, SOLUTION INTRAVENOUS at 10:31

## 2018-08-28 RX ADMIN — ALBUTEROL SULFATE SCH MG: 2.5 SOLUTION RESPIRATORY (INHALATION) at 18:59

## 2018-08-28 RX ADMIN — ALBUTEROL SULFATE SCH MG: 2.5 SOLUTION RESPIRATORY (INHALATION) at 08:21

## 2018-08-28 RX ADMIN — POTASSIUM CHLORIDE SCH MLS/HR: 200 INJECTION, SOLUTION INTRAVENOUS at 01:10

## 2018-08-28 RX ADMIN — SODIUM CHLORIDE, SODIUM LACTATE, POTASSIUM CHLORIDE, AND CALCIUM CHLORIDE SCH MLS/HR: 600; 310; 30; 20 INJECTION, SOLUTION INTRAVENOUS at 22:34

## 2018-08-28 RX ADMIN — SODIUM CHLORIDE SCH MLS/HR: 900 INJECTION INTRAVENOUS at 13:44

## 2018-08-28 RX ADMIN — POTASSIUM CHLORIDE SCH MLS/HR: 200 INJECTION, SOLUTION INTRAVENOUS at 04:08

## 2018-08-28 RX ADMIN — SODIUM CHLORIDE SCH MLS/HR: 900 INJECTION INTRAVENOUS at 22:35

## 2018-08-28 RX ADMIN — MAGNESIUM SULFATE IN DEXTROSE SCH MLS/HR: 10 INJECTION, SOLUTION INTRAVENOUS at 04:03

## 2018-08-28 NOTE — DIAGNOSTIC IMAGING REPORT
PATIENT HISTORY: Dyspnea. 



TECHNIQUE: Single frontal view of the chest.



COMPARISON: 08/27/2018.



FINDINGS:

There is persistent elevation of the right hemidiaphragm, with

hazy opacities in the right upper lung and left lung base, which

appear stable. The cardiac silhouette is stable in size. The

right jugular line tip appear stable. Prominent left perihilar

mass is noted. There is diffuse osteopenia. No pneumothorax or

pleural effusion is seen.



IMPRESSION: 



1. Stable large left perihilar mass.

2. Hazy opacities in the right upper lobe and left lung base, may

represent chronic scarring or infiltrate.



Dictated by: 



  Dictated on workstation # [id number]

## 2018-08-28 NOTE — PROGRESS NOTE (SOAP)
Subjective


Subjective/Events-last exam


Patient states that she feels better this AM. Tolerating PO diet. BM last night.


Review of Systems


Date Seen by Provider:  Aug 28, 2018


Time Seen by Provider:  09:35


General:  No Chills; Malaise


Pulmonary:  No Dyspnea, No Cough


Cardiovascular:  No: Chest Pain, Palpitations


Gastrointestinal:  No: Nausea, Vomiting, Abdominal Pain, Diarrhea, Constipation


Neurological:  Weakness





Focused Exam


Lactate Level


18 13:00: Lactic Acid Level 5.05*H


18 15:05: Lactic Acid Level 2.29*H


18 18:05: Lactic Acid Level 1.38





Objective


Exam


Last Set of Vital Signs





Vital Signs








  Date Time  Temp Pulse Resp B/P (MAP) Pulse Ox O2 Delivery O2 Flow Rate FiO2


 


18 19:00     100 Room Air  


 


18 18:00  111 30 108/88 (95)    


 


18 16:05 97.8       


 


18 20:04       2.00 





Capillary Refill : Less Than 3 Seconds


I&O











Intake and Output 


 


 18





 00:00


 


Intake Total 2323 ml


 


Output Total 900 ml


 


Balance 1423 ml


 


 


 


Intake Oral 0 ml


 


IV Total 2323 ml


 


Output Urine Total 900 ml


 


Daily Weight Change Yes, 14-23 lbs








General:  Alert, Oriented X3, Cooperative, No Acute Distress


HEENT:  PERRLA


Lungs:  Clear to Auscultation, Normal Air Movement


Heart:  Regular Rate, No Murmurs


Abdomen:  Normal Bowel Sounds, Soft, No Tenderness, No Hepatosplenomegaly, No 

Masses


Extremities:  Other (2+ pitting edema bilaterally)


Skin:  Other (Left leg wound bandaged)


Neuro:  Normal Speech, Cranial Nerves 3-12 NL


Psych/Mental Status:  Mental Status NL, Mood NL





Results/Procedures


Lab


Laboratory Tests


18 03:15: 


White Blood Count 14.2H, Red Blood Count 3.11L, Hemoglobin 7.4L, Hematocrit 25L

, Mean Corpuscular Volume 81, Mean Corpuscular Hemoglobin 24L, Mean Corpuscular 

Hemoglobin Concent 30L, Red Cell Distribution Width 19.2H, Platelet Count 277, 

Mean Platelet Volume 9.1, Neutrophils (%) (Auto) 90H, Lymphocytes (%) (Auto) 6L

, Monocytes (%) (Auto) 4, Eosinophils (%) (Auto) 0, Basophils (%) (Auto) 0, 

Neutrophils # (Auto) 12.8H, Lymphocytes # (Auto) 0.9L, Monocytes # (Auto) 0.5, 

Eosinophils # (Auto) 0.0, Basophils # (Auto) 0.0, Sodium Level 143, Potassium 

Level 2.8L, Chloride Level 101, Carbon Dioxide Level 29, Anion Gap 13, Blood 

Urea Nitrogen 17, Creatinine 0.82, Estimat Glomerular Filtration Rate > 60, BUN/

Creatinine Ratio 21, Glucose Level 220H, Calcium Level 7.3L, Corrected Calcium 

9.1, Phosphorus Level 2.8, Magnesium Level 2.6H, Iron Level 17L, Total Iron 

Binding Capacity 79L, Unsaturated Iron Binding Capacity 62, Transferrin % 

Saturation 22, Total Bilirubin 0.9, Aspartate Amino Transf (AST/SGOT) 12, 

Alanine Aminotransferase (ALT/SGPT) 10, Alkaline Phosphatase 80, Total Protein 

5.3L, Albumin 1.8L


18 12:15: 


White Blood Count 14.5H, Red Blood Count 3.05L, Hemoglobin 7.2L, Hematocrit 25L

, Mean Corpuscular Volume 81, Mean Corpuscular Hemoglobin 24L, Mean Corpuscular 

Hemoglobin Concent 29L, Red Cell Distribution Width 19.2H, Platelet Count 284, 

Mean Platelet Volume 9.4, Neutrophils (%) (Auto) 91H, Lymphocytes (%) (Auto) 5L

, Monocytes (%) (Auto) 4, Eosinophils (%) (Auto) 0, Basophils (%) (Auto) 0, 

Neutrophils # (Auto) 13.3H, Lymphocytes # (Auto) 0.7L, Monocytes # (Auto) 0.6, 

Eosinophils # (Auto) 0.0, Basophils # (Auto) 0.0, Sodium Level 142, Potassium 

Level 3.9, Chloride Level 104, Carbon Dioxide Level 26, Anion Gap 12, Blood 

Urea Nitrogen 17, Creatinine 0.79, Estimat Glomerular Filtration Rate > 60, BUN/

Creatinine Ratio 22, Glucose Level 230H, Calcium Level 7.3L, Corrected Calcium 

9.1, Total Bilirubin 0.6, Aspartate Amino Transf (AST/SGOT) 10, Alanine 

Aminotransferase (ALT/SGPT) 9, Alkaline Phosphatase 88, Total Protein 5.1L, 

Albumin 1.7L





Microbiology


18 Blood Culture - Preliminary, Resulted


          Gram Positive Cocci


18 Urine Culture - Final, Complete


          NO GROWTH


Radiology


Date of Exam:   18





CT CHEST W


 





PROCEDURE: CT chest with contrast only.





TECHNIQUE: Multiple contiguous axial images were obtained through


the chest after administration of intravenous contrast.





INDICATION: Cough, fever





COMPARISON: There are no previous CT chest examinations available


for comparison.





FINDINGS:  


The plain film examination of the chest performed earlier today


at 1:23 PM noted a fullness in the left hilum raising the


question of a neoplastic mass in this area. On this exam, there


is indeed a sizable 5.4 x 6.6 x 6.1 CM heterogeneous mass along


the medial aspect of the left upper lung. This mass should be


considered neoplastic until proven otherwise.





The heart is mildly enlarged. There are coronary calcifications


evident. The aorta is not abnormally dilated and there is no sign


of dissection. The pulmonary arteries were not well opacified and


consequently difficult to assess for a pulmonary embolus. 





There is no mediastinal or hilar adenopathy. There are chronic


pulmonary changes evident and there are coarse interstitial


densities about both carol ann and in both lung bases, particularly on


the right.





There is no obvious breast mass.





The sections through the upper abdomen fail to show any sign of


an acute abnormality. There are surgical clips about the


gallbladder fossa consistent with a prior cholecystectomy. There


appears to be a small collection of fluid in this area. This may


be fluid within the bowel as opposed to a free fluid collection.





Also, there is a 1.3 x 1.4 CM nodule associated with the medial


jeanine of the right adrenal gland. This may represent a benign


process. The possibility that this is secondary to metastatic


disease secondary to the large lung mass cannot be entirely


excluded however. If further imaging is desired, PET CT would be


recommended.





The bone windows show no sign of a fracture or of a destructive


lesion.





IMPRESSION:


1. There is a large mass along the medial aspect of the left


upper lobe. This should be considered neoplastic until proven


otherwise.


2. There is cardiomegaly and coronary artery disease and chronic


pulmonary disease. There is no acute cardiopulmonary abnormality


noted otherwise.


3. The nodule associated with the right adrenal gland may


represent a benign process. The possibility that this is


neoplastic in nature cannot be entirely excluded. Recommendations


as above.


4. The fluid collection in the right upper quadrant may be fluid


within the bowel. If further study is desired, then a complete CT


abdomen and pelvis exam should be obtained.





Assessment/Plan


Assessment/Plan





(1) Septic shock


Status:  Acute


Assessment & Plan:  - Patient has completed 30 mg/kg fluids, VS stable, LA 

resolved


- Started on Vanc/zosyn, blood culture pending


: + blood cultures for gram (+) cocci, continue vancomycin, VS stable, labs 

improving





(2) Pneumonia involving left lung


Status:  Acute


Assessment & Plan:  - Likely post obstructive 2/2 mass


- Will consult Ritika to see if patient is candidate for broch vs 

Interventional Rad for bx


Qualifiers:  


   Qualified Codes:  J18.1 - Lobar pneumonia, unspecified organism


(3) Mass of left lung


Status:  Acute


Assessment & Plan:  : Plan for bronch on Thurs with Dr Yost





(4) Wound of left leg


Status:  Acute


Assessment & Plan:  - Consult wound care


- Concerned for poor vascular supply


Qualifiers:  


   Qualified Codes:  S81.802A - Unspecified open wound, left lower leg, initial 

encounter


(5) Localized swelling of both lower extremities


Status:  Chronic


(6) Decubital ulcer


Status:  Chronic


Assessment & Plan:  - Wound care


Qualifiers:  


   Qualified Codes:  L89.159 - Pressure ulcer of sacral region, unspecified 

stage


(7) Diabetes


Status:  Chronic


Assessment & Plan:  - SSI, A1c pending


Qualifiers:  


   Qualified Codes:  E11.9 - Type 2 diabetes mellitus without complications


(8) HTN (hypertension)


Status:  Chronic


Assessment & Plan:  - Will hold bp meds due to shock at this time, will 

continue to monitor


Qualifiers:  


   Qualified Codes:  I10 - Essential (primary) hypertension


(9) Severe protein-calorie malnutrition


Status:  Chronic


Assessment & Plan:  : Add Glucerna to diet





(10) Elevated INR


Status:  Acute


Assessment & Plan:  - will repeat in AM





(11) Elevated bilirubin


Status:  Resolved


Assessment & Plan:  - Repeat in AM





(12) Normocytic anemia


Status:  Chronic


Assessment & Plan:  - Will add iron panel, repeat in AM


: Mixed Iron deficiency with chronic disease





(13) Hypokalemia


Status:  Resolved


(14) Hypomagnesemia


Status:  Resolved





Clinical Quality Measures


DVT/VTE Risk/Contraindication:


Risk Factor Score Per Nursin


RFS Level Per Nursing on Admit:  4+=Very High











URSZULA CHRISTENSEN MD Aug 28, 2018 19:17

## 2018-08-28 NOTE — PULMONARY CONSULTATION
History of Present Illness


History of Present Illness


Date of Consultation


8/28/18


 06:44


Time Seen by Provider:  06:44


Date of Admission





History of Present Illness


82yo WM poor historian presented to ED secondary to worsening SOB, fever, 

weakness and s/p fall that started about 2 months ago. PT has lost about 20lbs. 

PT did not hit her head. PT was found to have a large left hilar mass upon 

admission. 














Allergies and Home Medications


Allergies


Coded Allergies:  


     No Known Drug Allergies (Unverified , 8/27/18)





Home Medications


Amlodipine Besylate 5 Mg Tablet, 10 MG PO DAILY, (Reported)


   TAKES 2 (5MG) TABLETS 


Atenolol 50 Mg Tablet, 50 MG PO DAILY, (Reported)


Cyanocobalamin (Vitamin B-12) 1,000 Mcg Tablet, 1,000 MCG PO DAILY, (Reported)


Ezetimibe 10 Mg Tablet, 10 MG PO HS, (Reported)


Furosemide 40 Mg Tablet, 40 MG PO DAILY, (Reported)


Levothyroxine Sodium 125 Mcg Tablet, 125 MCG PO DAILY, (Reported)


Omeprazole 20 Mg Capsule.dr, 20 MG PO DAILY PRN for HEARTBURN, (Reported)





Past Medical-Social-Family Hx


Past Med/Social Hx:  Reviewed Nursing Past Med/Soc Hx


Patient Social History


Alcohol Use:  Denies Use


Recreational Drug Use:  No


Smoking Status:  Never a Smoker


Recent Foreign Travel:  No


Contact w/Someone Who Travel:  No


Recent Infectious Disease Expo:  No


Recent Hopitalizations:  No





Immunizations Up To Date


Date of Pneumonia Vaccine:  Aug 27, 2013





Seasonal Allergies


Seasonal Allergies:  No





Past Medical History


Surgeries:  Yes (BILAT HIP, CHOLECYSTECTOMY, EYE SX)


Eye Surgery, Gallbladder, Hysterectomy, Orthopedic


Respiratory:  No


Cardiac:  Yes


Hypertension


Neurological:  No


Genitourinary:  No


Gastrointestinal:  No


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


Endocrine:  Yes (DIABETES)


HEENT:  Yes


Cataract


Cancer:  No


Psychosocial:  No


Integumentary:  No


Blood Disorders:  No


Adverse Reaction/Blood Tranf:  No





Family Medical History


Reviewed Nursing Family Hx


No Pertinent Family Hx





Review of Systems


Time Seen by Provider:  09:28





Sepsis Event


Evaluation


Height, Weight, BMI


Height: 5'5.00"


Weight: 135lbs. 3.0oz. [id number]; 21.5 BMI


Method:Stated





Exam


Exam





Vital Signs








  Date Time  Temp Pulse Resp B/P (MAP) Pulse Ox O2 Delivery O2 Flow Rate FiO2


 


8/28/18 06:00  78 16 138/77 (97) 100 Room Air  


 


8/28/18 05:00  75 28 137/81 (99) 98 Room Air  


 


8/28/18 04:00 97.3       


 


8/28/18 04:00     96 Room Air  


 


8/28/18 04:00  75 23 131/72 (91) 95 Room Air  


 


8/28/18 03:00  74 16 137/66 (89) 98 Room Air  


 


8/28/18 02:00  78 12 131/65 (87) 96 Room Air  


 


8/28/18 01:00  75      


 


8/28/18 01:00  75 15 138/70 (92) 96 Room Air  


 


8/28/18 00:00     95 Room Air  


 


8/28/18 00:00 97.1       


 


8/28/18 00:00  77 15 135/65 (88) 97 Room Air  


 


8/27/18 23:00  77 15 133/63 (86) 100 Room Air  


 


8/27/18 22:00  83 21 128/72 (90) 100 Room Air  


 


8/27/18 21:59     95 Room Air  


 


8/27/18 21:00  72 14 128/69 (88) 97 Room Air  


 


8/27/18 20:04      Nasal Cannula 2.00 


 


8/27/18 20:00  91 12 138/82 (100) 100 Room Air  


 


8/27/18 20:00     95 Room Air  


 


8/27/18 20:00 97.1       


 


8/27/18 19:35  81   93   


 


8/27/18 19:00  97      


 


8/27/18 19:00  84 14 137/81 (99) 97 Room Air  


 


8/27/18 18:00  78 21 141/72 (95) 93 Room Air  


 


8/27/18 17:45  76 15 140/70 (93) 98 Room Air  


 


8/27/18 17:30  85 10 144/74 (97) 96 Room Air  


 


8/27/18 17:15  73 31 135/67 (89) 94 Room Air  


 


8/27/18 17:00  82 15 135/72 (93) 95 Room Air  


 


8/27/18 16:45  78 15 145/60 (88) 93 Room Air  


 


8/27/18 16:30     95 Room Air  


 


8/27/18 16:23  84      


 


8/27/18 16:15 97.7 87 23 143/93 (110) 95 Room Air  


 


8/27/18 16:10 96.0 78 20 129/65 (86) 98 Nasal Cannula 2.00 


 


8/27/18 13:30 100.2       


 


8/27/18 12:55     98 Nasal Cannula 2.00 


 


8/27/18 12:55 100.2 93  124/68 (86) 98 Nasal Cannula 2.00 














I & O 


 


 8/28/18





 07:00


 


Intake Total 2623 ml


 


Output Total 1225 ml


 


Balance 1398 ml








Height & Weight


Height: 5'5.00"


Weight: 135lbs. 3.0oz. 61.040289qv; 21.5 BMI


Method:Stated


General Appearance:  Mild Distress, Thin


HEENT:  PERRL/EOMI, Pharynx Normal


Neck:  Non Tender, Supple


Respiratory:  No Respiratory Distress, Crackles


Cardiovascular:  No Murmur, Tachycardia


Capillary Refill:  Less Than 3 Seconds


Gastrointestinal:  non tender, soft, no organomegaly; No hepatomegaly, No 

spleenomegaly


Extremity:  Normal Capillary Refill, Pedal Edema (2+ to the mid tibia bilateral)


Neurologic/Psychiatric:  Alert, Motor Weakness (global)





Results


Lab


Laboratory Tests


8/27/18 13:00








8/27/18 17:15








8/28/18 03:15











Assessment/Plan


Assessment/Plan


Septic shock secondary to Pneumonia vs UTI 


   -IVF


   -Continue Vanco and Zosyn 


   -Pan cultures pending.


Large left mediastinal lung mass. 


   -Will schedule bronchoscopy with fluoroscopy for Thursday morning 


Severe hypokalemia


   -replace and recheck 


Left leg wound/decub ulcer 


   -Tissue trauma 


Malnutrition    


   -Monitor 


   -Give multi Vitamin and Vit K secondary to elevated INR. It does not appear 

pt has been on any anticoagulation 


DM, HTN











ADELITA CAGLE DO Aug 28, 2018 06:49

## 2018-08-28 NOTE — WOUND CARE ASSESSMENT
Wound Care Assessment


Date Seen by Provider:  Aug 28, 2018


Time Seen by Provider:  10:45


Chief Complaint


Lesion of L calf.


HPI


The patient is a pleasant 83 year old female admitted for sepsis, noted to have 

a large lung mass in a non-smoker, with a ten year history of a non-healing 

ulcer of the L calf.  Moderate pain in the L calf.  She has a history of polio, 

affecting her L leg, which is shorter.  The ulcer appears atypical and will 

need a biopsy for histology to rule out squamous cell carcinoma, when the 

patient is transferred out of ICU.  We will dress with Xeroform in the meantime.


Past Medical History:  Admits Heart Disease


Smoking Status:  Never a Smoker


Recreational Drug Use:  No


Alcohol Use:  Denies Use


Review of Systems


Pulmonary:  Dyspnea


Cardiovascular:  Chest Pain





Exam





Vital Signs








  Date Time  Temp Pulse Resp B/P (MAP) Pulse Ox O2 Delivery O2 Flow Rate FiO2


 


8/28/18 10:00  90 11 113/64 (80) 97 Room Air  


 


8/28/18 08:18 97.6       


 


8/27/18 20:04       2.00 





Capillary Refill : Less Than 3 Seconds


General Appearance:  thin


Respiratory:  no respiratory distress


Back:  other (No open lesion of the sacral area.)


Skin:  other (L anterior calf  --  3.5 x 2.8 x 0.1 cm, base uniform white 

tissue of uncertain nature.  Periwound is inflamed.)





Results


Laboratory Tests


8/27/18 13:00: 


White Blood Count 14.2H, Red Blood Count 3.48L, Hemoglobin 8.1L, Hematocrit 29L

, Mean Corpuscular Volume 82, Mean Corpuscular Hemoglobin 23L, Mean Corpuscular 

Hemoglobin Concent 28L, Red Cell Distribution Width 19.4H, Platelet Count 382, 

Mean Platelet Volume 9.1, Neutrophils (%) (Auto) 90H, Lymphocytes (%) (Auto) 5L

, Monocytes (%) (Auto) 5, Eosinophils (%) (Auto) 0, Basophils (%) (Auto) 0, 

Neutrophils # (Auto) 12.7H, Lymphocytes # (Auto) 0.7L, Monocytes # (Auto) 0.8, 

Eosinophils # (Auto) 0.0, Basophils # (Auto) 0.0, Neutrophils % (Manual) 90, 

Lymphocytes % (Manual) 4, Monocytes % (Manual) 2, Eosinophils % (Manual) 1, 

Basophils % (Manual) 0, Band Neutrophils 3, Hypochromasia MODERATE, 

Anisocytosis MARKED, Spherocytes SLIGHT, Prothrombin Time 19.8H, INR Comment 

1.7H, Activated Partial Thromboplast Time 27, Sodium Level 146H, Potassium 

Level 2.3*L, Chloride Level 97L, Carbon Dioxide Level 34H, Anion Gap 15H, Blood 

Urea Nitrogen 17, Creatinine 0.96, Estimat Glomerular Filtration Rate 56, BUN/

Creatinine Ratio 18, Glucose Level 189H, Lactic Acid Level 5.05*H, Calcium 

Level 7.8L, Corrected Calcium 9.6, Total Bilirubin 1.5H, Aspartate Amino Transf 

(AST/SGOT) 13, Alanine Aminotransferase (ALT/SGPT) 6, Alkaline Phosphatase 84, 

Total Protein 5.6L, Albumin 1.8L


8/27/18 13:15: 


Urine Color YELLOW, Urine Clarity SLIGHTLY CLOUDY, Urine pH 6, Urine Specific 

Gravity 1.015L, Urine Protein 2+H, Urine Glucose (UA) NEGATIVE, Urine Ketones 1+

H, Urine Nitrite NEGATIVE, Urine Bilirubin 1+H, Urine Urobilinogen 4H, Urine 

Leukocyte Esterase 1+H, Urine RBC (Auto) 4+H, Urine RBC 0-2, Urine WBC RARE, 

Urine Squamous Epithelial Cells 2-5, Urine Crystals NONE, Urine Bacteria 

NEGATIVE, Urine Casts NONE, Urine Mucus SMALLH, Urine Culture Indicated NO


8/27/18 15:05: Lactic Acid Level 2.29*H


8/27/18 16:36: Glucometer 174H


8/27/18 17:15: 


Sodium Level 145, Potassium Level 2.0*L, Chloride Level 102, Carbon Dioxide 

Level 34H, Anion Gap 9, Blood Urea Nitrogen 16, Creatinine 0.79, Estimat 

Glomerular Filtration Rate > 60, BUN/Creatinine Ratio 20, Glucose Level 179H, 

Calcium Level 6.9L, Corrected Calcium 8.7, Magnesium Level 1.1L, Total 

Bilirubin 1.4H, Aspartate Amino Transf (AST/SGOT) 14, Alanine Aminotransferase (

ALT/SGPT) 8, Alkaline Phosphatase 73, Total Protein 5.0L, Albumin 1.7L


8/27/18 18:05: Lactic Acid Level 1.38


8/28/18 03:15: 


Sodium Level 143, Potassium Level 2.8L, Chloride Level 101, Carbon Dioxide 

Level 29, Anion Gap 13, Blood Urea Nitrogen 17, Creatinine 0.82, Estimat 

Glomerular Filtration Rate > 60, BUN/Creatinine Ratio 21, Glucose Level 220H, 

Calcium Level 7.3L, Corrected Calcium 9.1, Magnesium Level 2.6H, Total 

Bilirubin 0.9, Aspartate Amino Transf (AST/SGOT) 12, Alanine Aminotransferase (

ALT/SGPT) 10, Alkaline Phosphatase 80, Total Protein 5.3L, Albumin 1.8L, White 

Blood Count 14.2H, Red Blood Count 3.11L, Hemoglobin 7.4L, Hematocrit 25L, Mean 

Corpuscular Volume 81, Mean Corpuscular Hemoglobin 24L, Mean Corpuscular 

Hemoglobin Concent 30L, Red Cell Distribution Width 19.2H, Platelet Count 277, 

Mean Platelet Volume 9.1, Neutrophils (%) (Auto) 90H, Lymphocytes (%) (Auto) 6L

, Monocytes (%) (Auto) 4, Eosinophils (%) (Auto) 0, Basophils (%) (Auto) 0, 

Neutrophils # (Auto) 12.8H, Lymphocytes # (Auto) 0.9L, Monocytes # (Auto) 0.5, 

Eosinophils # (Auto) 0.0, Basophils # (Auto) 0.0, Phosphorus Level 2.8





Microbiology


8/27/18 Urine Culture - Final, Complete


          NO GROWTH





Microbiology


8/27/18 Urine Culture - Final, Complete


          NO GROWTH





Assessment/Plan/Dx


1. L calf lesion, atypical.





2. Venous insufficiency, L leg.





3. Chest mass.





Plan:  Will dress with Xeroform, and plan biopsy of L calf lesion when the 

patient is stable enough to go to floor.











NORTH CARTER MD Aug 28, 2018 11:13

## 2018-08-29 VITALS — DIASTOLIC BLOOD PRESSURE: 86 MMHG | SYSTOLIC BLOOD PRESSURE: 136 MMHG

## 2018-08-29 VITALS — SYSTOLIC BLOOD PRESSURE: 122 MMHG | DIASTOLIC BLOOD PRESSURE: 70 MMHG

## 2018-08-29 VITALS — DIASTOLIC BLOOD PRESSURE: 111 MMHG | SYSTOLIC BLOOD PRESSURE: 158 MMHG

## 2018-08-29 VITALS — DIASTOLIC BLOOD PRESSURE: 92 MMHG | SYSTOLIC BLOOD PRESSURE: 117 MMHG

## 2018-08-29 VITALS — DIASTOLIC BLOOD PRESSURE: 97 MMHG | SYSTOLIC BLOOD PRESSURE: 112 MMHG

## 2018-08-29 VITALS — DIASTOLIC BLOOD PRESSURE: 59 MMHG | SYSTOLIC BLOOD PRESSURE: 121 MMHG

## 2018-08-29 VITALS — SYSTOLIC BLOOD PRESSURE: 107 MMHG | DIASTOLIC BLOOD PRESSURE: 66 MMHG

## 2018-08-29 VITALS — SYSTOLIC BLOOD PRESSURE: 109 MMHG | DIASTOLIC BLOOD PRESSURE: 68 MMHG

## 2018-08-29 VITALS — DIASTOLIC BLOOD PRESSURE: 66 MMHG | SYSTOLIC BLOOD PRESSURE: 110 MMHG

## 2018-08-29 VITALS — DIASTOLIC BLOOD PRESSURE: 96 MMHG | SYSTOLIC BLOOD PRESSURE: 125 MMHG

## 2018-08-29 VITALS — DIASTOLIC BLOOD PRESSURE: 74 MMHG | SYSTOLIC BLOOD PRESSURE: 116 MMHG

## 2018-08-29 VITALS — DIASTOLIC BLOOD PRESSURE: 66 MMHG | SYSTOLIC BLOOD PRESSURE: 96 MMHG

## 2018-08-29 VITALS — DIASTOLIC BLOOD PRESSURE: 66 MMHG | SYSTOLIC BLOOD PRESSURE: 122 MMHG

## 2018-08-29 VITALS — DIASTOLIC BLOOD PRESSURE: 73 MMHG | SYSTOLIC BLOOD PRESSURE: 114 MMHG

## 2018-08-29 VITALS — SYSTOLIC BLOOD PRESSURE: 149 MMHG | DIASTOLIC BLOOD PRESSURE: 98 MMHG

## 2018-08-29 VITALS — SYSTOLIC BLOOD PRESSURE: 112 MMHG | DIASTOLIC BLOOD PRESSURE: 91 MMHG

## 2018-08-29 VITALS — SYSTOLIC BLOOD PRESSURE: 128 MMHG | DIASTOLIC BLOOD PRESSURE: 64 MMHG

## 2018-08-29 VITALS — SYSTOLIC BLOOD PRESSURE: 119 MMHG | DIASTOLIC BLOOD PRESSURE: 85 MMHG

## 2018-08-29 VITALS — DIASTOLIC BLOOD PRESSURE: 79 MMHG | SYSTOLIC BLOOD PRESSURE: 129 MMHG

## 2018-08-29 VITALS — DIASTOLIC BLOOD PRESSURE: 90 MMHG | SYSTOLIC BLOOD PRESSURE: 131 MMHG

## 2018-08-29 VITALS — SYSTOLIC BLOOD PRESSURE: 106 MMHG | DIASTOLIC BLOOD PRESSURE: 81 MMHG

## 2018-08-29 VITALS — DIASTOLIC BLOOD PRESSURE: 81 MMHG | SYSTOLIC BLOOD PRESSURE: 106 MMHG

## 2018-08-29 VITALS — SYSTOLIC BLOOD PRESSURE: 96 MMHG | DIASTOLIC BLOOD PRESSURE: 66 MMHG

## 2018-08-29 VITALS — DIASTOLIC BLOOD PRESSURE: 98 MMHG | SYSTOLIC BLOOD PRESSURE: 155 MMHG

## 2018-08-29 VITALS — DIASTOLIC BLOOD PRESSURE: 93 MMHG | SYSTOLIC BLOOD PRESSURE: 114 MMHG

## 2018-08-29 VITALS — DIASTOLIC BLOOD PRESSURE: 88 MMHG | SYSTOLIC BLOOD PRESSURE: 134 MMHG

## 2018-08-29 VITALS — DIASTOLIC BLOOD PRESSURE: 78 MMHG | SYSTOLIC BLOOD PRESSURE: 135 MMHG

## 2018-08-29 VITALS — DIASTOLIC BLOOD PRESSURE: 76 MMHG | SYSTOLIC BLOOD PRESSURE: 129 MMHG

## 2018-08-29 LAB
ALBUMIN SERPL-MCNC: 2.8 GM/DL (ref 3.2–4.5)
ALP SERPL-CCNC: 94 U/L (ref 40–136)
ALT SERPL-CCNC: 6 U/L (ref 0–55)
ARTERIAL PATENCY WRIST A: (no result)
BASE EXCESS STD BLDA CALC-SCNC: 5 MMOL/L (ref -2.5–2.5)
BASE EXCESS STD BLDA CALC-SCNC: 6.6 MMOL/L (ref -2.5–2.5)
BASOPHILS # BLD AUTO: 0 10^3/UL (ref 0–0.1)
BASOPHILS NFR BLD AUTO: 0 % (ref 0–10)
BDY SITE: (no result)
BDY SITE: (no result)
BILIRUB SERPL-MCNC: 0.9 MG/DL (ref 0.1–1)
BODY TEMPERATURE: 97.9
BODY TEMPERATURE: 98.6
BUN/CREAT SERPL: 20
CALCIUM SERPL-MCNC: 8 MG/DL (ref 8.5–10.1)
CHLORIDE SERPL-SCNC: 105 MMOL/L (ref 98–107)
CO2 BLDA CALC-SCNC: 30.6 MMOL/L (ref 21–31)
CO2 BLDA CALC-SCNC: 32.3 MMOL/L (ref 21–31)
CO2 SERPL-SCNC: 25 MMOL/L (ref 21–32)
CREAT SERPL-MCNC: 0.83 MG/DL (ref 0.6–1.3)
EOSINOPHIL # BLD AUTO: 0 10^3/UL (ref 0–0.3)
EOSINOPHIL NFR BLD AUTO: 0 % (ref 0–10)
ERYTHROCYTE [DISTWIDTH] IN BLOOD BY AUTOMATED COUNT: 19.2 % (ref 10–14.5)
GFR SERPLBLD BASED ON 1.73 SQ M-ARVRAT: > 60 ML/MIN
GLUCOSE SERPL-MCNC: 298 MG/DL (ref 70–105)
HCT VFR BLD CALC: 28 % (ref 35–52)
HGB BLD-MCNC: 8.2 G/DL (ref 11.5–16)
INHALED O2 FLOW RATE: (no result) L/MIN
INHALED O2 FLOW RATE: (no result) L/MIN
LYMPHOCYTES # BLD AUTO: 0.7 X 10^3 (ref 1–4)
LYMPHOCYTES NFR BLD AUTO: 6 % (ref 12–44)
MANUAL DIFFERENTIAL PERFORMED BLD QL: NO
MCH RBC QN AUTO: 25 PG (ref 25–34)
MCHC RBC AUTO-ENTMCNC: 30 G/DL (ref 32–36)
MCV RBC AUTO: 84 FL (ref 80–99)
MONOCYTES # BLD AUTO: 0.5 X 10^3 (ref 0–1)
MONOCYTES NFR BLD AUTO: 5 % (ref 0–12)
NEUTROPHILS # BLD AUTO: 9.7 X 10^3 (ref 1.8–7.8)
NEUTROPHILS NFR BLD AUTO: 89 % (ref 42–75)
PCO2 BLDA: 45 MMHG (ref 35–45)
PCO2 BLDA: 46 MMHG (ref 35–45)
PH BLDA: 7.43 [PH] (ref 7.37–7.43)
PH BLDA: 7.44 [PH] (ref 7.37–7.43)
PLATELET # BLD: 251 10^3/UL (ref 130–400)
PMV BLD AUTO: 9.3 FL (ref 7.4–10.4)
PO2 BLDA: 103 MMHG (ref 79–93)
PO2 BLDA: 73 MMHG (ref 79–93)
POTASSIUM SERPL-SCNC: 3.6 MMOL/L (ref 3.6–5)
PROT SERPL-MCNC: 5.7 GM/DL (ref 6.4–8.2)
RBC # BLD AUTO: 3.28 10^6/UL (ref 4.35–5.85)
SAO2 % BLDA FROM PO2: 96 % (ref 94–100)
SAO2 % BLDA FROM PO2: 99 % (ref 94–100)
SODIUM SERPL-SCNC: 143 MMOL/L (ref 135–145)
VENTILATION MODE VENT: NO
VENTILATION MODE VENT: NO
WBC # BLD AUTO: 11 10^3/UL (ref 4.3–11)

## 2018-08-29 RX ADMIN — MAGNESIUM SULFATE IN DEXTROSE SCH MLS/HR: 10 INJECTION, SOLUTION INTRAVENOUS at 05:39

## 2018-08-29 RX ADMIN — ATENOLOL SCH MG: 50 TABLET ORAL at 08:25

## 2018-08-29 RX ADMIN — ALBUTEROL SULFATE SCH MG: 2.5 SOLUTION RESPIRATORY (INHALATION) at 07:13

## 2018-08-29 RX ADMIN — POTASSIUM CHLORIDE SCH MLS/HR: 200 INJECTION, SOLUTION INTRAVENOUS at 05:39

## 2018-08-29 RX ADMIN — SODIUM CHLORIDE SCH MLS/HR: 900 INJECTION, SOLUTION INTRAVENOUS at 08:25

## 2018-08-29 RX ADMIN — ALBUTEROL SULFATE SCH MG: 2.5 SOLUTION RESPIRATORY (INHALATION) at 19:49

## 2018-08-29 RX ADMIN — Medication SCH EA: at 08:24

## 2018-08-29 RX ADMIN — ENOXAPARIN SODIUM SCH MG: 100 INJECTION SUBCUTANEOUS at 14:45

## 2018-08-29 RX ADMIN — SODIUM CHLORIDE SCH MLS/HR: 900 INJECTION INTRAVENOUS at 06:00

## 2018-08-29 RX ADMIN — ATENOLOL SCH MG: 50 TABLET ORAL at 09:00

## 2018-08-29 RX ADMIN — SODIUM CHLORIDE SCH MLS/HR: 900 INJECTION INTRAVENOUS at 22:04

## 2018-08-29 RX ADMIN — POTASSIUM CHLORIDE SCH MLS/HR: 200 INJECTION, SOLUTION INTRAVENOUS at 08:26

## 2018-08-29 RX ADMIN — POTASSIUM CHLORIDE SCH MLS/HR: 200 INJECTION, SOLUTION INTRAVENOUS at 08:37

## 2018-08-29 RX ADMIN — INSULIN ASPART SCH UNIT: 100 INJECTION, SOLUTION INTRAVENOUS; SUBCUTANEOUS at 16:53

## 2018-08-29 RX ADMIN — POTASSIUM CHLORIDE SCH MEQ: 1500 TABLET, EXTENDED RELEASE ORAL at 06:55

## 2018-08-29 RX ADMIN — SODIUM CHLORIDE, SODIUM LACTATE, POTASSIUM CHLORIDE, AND CALCIUM CHLORIDE SCH MLS/HR: 600; 310; 30; 20 INJECTION, SOLUTION INTRAVENOUS at 14:44

## 2018-08-29 RX ADMIN — MORPHINE SULFATE PRN MG: 4 INJECTION, SOLUTION INTRAMUSCULAR; INTRAVENOUS at 14:22

## 2018-08-29 RX ADMIN — SODIUM CHLORIDE SCH MLS/HR: 900 INJECTION INTRAVENOUS at 14:11

## 2018-08-29 RX ADMIN — Medication SCH EA: at 07:00

## 2018-08-29 RX ADMIN — POTASSIUM CHLORIDE SCH MLS/HR: 200 INJECTION, SOLUTION INTRAVENOUS at 08:24

## 2018-08-29 RX ADMIN — POTASSIUM CHLORIDE SCH MLS/HR: 200 INJECTION, SOLUTION INTRAVENOUS at 08:25

## 2018-08-29 NOTE — DIAGNOSTIC IMAGING REPORT
PROCEDURE: CT angiography of the chest with contrast.



TECHNIQUE: Multiple contiguous axial images were obtained through

the chest after uneventful bolus administration of intravenous

contrast. Reconstructed CTA MIP acquisitions were also performed.

 

INDICATION:  Respiratory distress



The recent CT chest exam performed on 8/27/18 noted a large mass

in the left upper lung but failed to show any sign of an acute

cardiopulmonary abnormality. The plain film examination of the

chest performed earlier today at 3:01 AM suggested pulmonary

edema and bibasilar atelectasis and/or pneumonitis. On this exam

those findings are again evident. In the interval since the prior

study bibasal pneumonia/atelectasis and bilateral pleural

effusions have developed. The fluid in the left lung base

measures 1.5 CM maximum depth of fluid in the right lung base is

estimated at 1.1 CM. There are also faint groundglass densities

in both lungs and these may be secondary to pulmonary edema. The

heart itself is stable in size when compared to the prior exam.



The main pulmonary arteries are well opacified. There is no

defect to suggest a pulmonary embolus. However there does appear

to be a filling defect within one of the branches of the

pulmonary arteries to the right lower lobe. While this finding

could be secondary to flow phenomena the possibility that this is

related to a pulmonary embolus cannot be entirely excluded. There

is no other definite defect to indicate a pulmonary embolus. The

aorta is unchanged when compared to the prior exam.



 The large mass in the left upper lung seen previously is again

evident and no different. The sections through the upper abdomen

fail to show any sign of an acute abnormality. The bone windows

are unremarkable for a fracture or for a destructive lesion.



IMPRESSION:

1. The appearance of the chest has worsened since the prior study

as bibasal pneumonia/atelectasis and bilateral pleural effusions

have developed. There also appears to an element of pulmonary

congestion present.

2. The defect within one of the branches of the pulmonary artery

to the right lung base is suspicious but not conclusive for

pulmonary embolus. Clinical followup is recommended.

3. These results were discussed with Dr. Heraclio Yost.



Dictated by: 



  Dictated on workstation # [id number]

## 2018-08-29 NOTE — DIAGNOSTIC IMAGING REPORT
PROCEDURE: US Venous Lower Ext Jared.



TECHNIQUE: Multiple real-time grayscale images were obtained over

the lower extremities in various projections, bilaterally.

Additional duplex Doppler and color Doppler images were also

obtained.



INDICATION: Bilateral lower extreme swelling.



FINDINGS: The common femoral, femoral, popliteal veins and tibial

veins demonstrate normal response to compression, augmentation

and Valsalva. There are no abnormal lower extremity fluid

collections or masses.



IMPRESSION: No evidence of deep venous thrombosis in either lower

extremity. 







Dictated by: 



  Dictated on workstation # [id number]

## 2018-08-29 NOTE — PROGRESS NOTE (SOAP)
[facility] MEDICAL STUDENT 18 1442:


Subjective


Subjective/Events-last exam


Pt reported that she is not having any SOB when she is on oxygen. She reported 

to be confused as far as what is going on with her during the hospitalization. 

I attempted to repeat the explanation 3 times and she somewhat understood it in 

the end. She had no other acute concerns this morning.


Review of Systems


Time Seen by Provider:  11:30


General:  No Chills, No Night Sweats


Pulmonary:  No Dyspnea


Cardiovascular:  No: Chest Pain


Gastrointestinal:  No: Nausea, Vomiting


Genitourinary:  No Dysuria





Focused Exam


Lactate Level


18 13:00: Lactic Acid Level 5.05*H


18 15:05: Lactic Acid Level 2.29*H


18 18:05: Lactic Acid Level 1.38


Respiratory:  Lungs Clear, Normal Breath Sounds, No Accessory Muscle Use


Cardiovascular:  Regular Rate, Rhythm, No Edema, No Murmur, Normal Peripheral 

Pulses


Skin:  ecchymosis (over R wrist d/t IV ), other (L leg lesion)





Objective


Exam


Last Set of Vital Signs





Vital Signs








  Date Time  Temp Pulse Resp B/P (MAP) Pulse Ox O2 Delivery O2 Flow Rate FiO2


 


18 14:30  112 24  96  40.00 


 


18 12:00    131/90 (104)  Nasal Cannula  


 


18 04:00 98.4       


 


18 04:00        50





Capillary Refill : Less Than 3 Seconds


I&O











Intake and Output 


 


 18





 00:00


 


Intake Total 3612.5 ml


 


Output Total 480 ml


 


Balance 3132.5 ml


 


 


 


Intake Oral 1050 ml


 


IV Total 2562.5 ml


 


Output Urine Total 480 ml








General:  Alert, No Acute Distress


HEENT:  Atraumatic, EOMI


Neck:  Supple


Lungs:  Clear to Auscultation, Normal Air Movement


Heart:  Regular Rate, Normal S1, Normal S2, No Murmurs


Abdomen:  Normal Bowel Sounds, Soft, No Tenderness


Extremities:  Other (L charcot joint on LE, pale BL LE, BL LE edema 4+)


Skin:  Other (L leg skin lesion)





Results/Procedures


Lab


Laboratory Tests


18 04:30: 


White Blood Count 11.0, Red Blood Count 3.28L, Hemoglobin 8.2L, Hematocrit 28L, 

Mean Corpuscular Volume 84, Mean Corpuscular Hemoglobin 25, Mean Corpuscular 

Hemoglobin Concent 30L, Red Cell Distribution Width 19.2H, Platelet Count 251, 

Mean Platelet Volume 9.3, Neutrophils (%) (Auto) 89H, Lymphocytes (%) (Auto) 6L

, Monocytes (%) (Auto) 5, Eosinophils (%) (Auto) 0, Basophils (%) (Auto) 0, 

Neutrophils # (Auto) 9.7H, Lymphocytes # (Auto) 0.7L, Monocytes # (Auto) 0.5, 

Eosinophils # (Auto) 0.0, Basophils # (Auto) 0.0, Sodium Level 143, Potassium 

Level 3.6, Chloride Level 105, Carbon Dioxide Level 25, Anion Gap 13, Blood 

Urea Nitrogen 17, Creatinine 0.83, Estimat Glomerular Filtration Rate > 60, BUN/

Creatinine Ratio 20, Glucose Level 298H, Calcium Level 8.0L, Corrected Calcium 

9.0, Total Bilirubin 0.9, Aspartate Amino Transf (AST/SGOT) 9, Alanine 

Aminotransferase (ALT/SGPT) 6, Alkaline Phosphatase 94, Troponin I < 0.30, B-

Type Natriuretic Peptide 1203.3H, Total Protein 5.7L, Albumin 2.8L, Thyroid 

Stimulating Hormone (TSH) 7.15H


18 05:45: 


Blood Gas Puncture Site RIGHT RADIAL, Blood Gas Patient Temperature 98.6, 

Arterial Blood pH 7.43, Arterial Blood Partial Pressure CO2 45, Arterial Blood 

Partial Pressure O2 103H, Arterial Blood HCO3 29H, Arterial Blood Total CO2 30.6

, Arterial Blood Oxygen Saturation 99, Arterial Blood Base Excess 5.0H, Serge 

Test YES-POS, Blood Gas Ventilator Setting NO, Blood Gas Inspired Oxygen 50%


18 11:33: D-Dimer 2.36H


18 12:03: Troponin I < 0.30


18 13:31: Lab Scanned Report Transfusion Reaction Form





Microbiology


18 Blood Culture - Preliminary, Resulted


          Staph, Coag Neg (CNS)


18 MRSA Screen - Final, Complete


          MRSA not isolated


18 Urine Culture - Final, Complete


          NO GROWTH


Radiology


Date of Exam:   18





CT CHEST W


 





PROCEDURE: CT chest with contrast only.





TECHNIQUE: Multiple contiguous axial images were obtained through


the chest after administration of intravenous contrast.





INDICATION: Cough, fever





COMPARISON: There are no previous CT chest examinations available


for comparison.





FINDINGS:  


The plain film examination of the chest performed earlier today


at 1:23 PM noted a fullness in the left hilum raising the


question of a neoplastic mass in this area. On this exam, there


is indeed a sizable 5.4 x 6.6 x 6.1 CM heterogeneous mass along


the medial aspect of the left upper lung. This mass should be


considered neoplastic until proven otherwise.





The heart is mildly enlarged. There are coronary calcifications


evident. The aorta is not abnormally dilated and there is no sign


of dissection. The pulmonary arteries were not well opacified and


consequently difficult to assess for a pulmonary embolus. 





There is no mediastinal or hilar adenopathy. There are chronic


pulmonary changes evident and there are coarse interstitial


densities about both carol ann and in both lung bases, particularly on


the right.





There is no obvious breast mass.





The sections through the upper abdomen fail to show any sign of


an acute abnormality. There are surgical clips about the


gallbladder fossa consistent with a prior cholecystectomy. There


appears to be a small collection of fluid in this area. This may


be fluid within the bowel as opposed to a free fluid collection.





Also, there is a 1.3 x 1.4 CM nodule associated with the medial


jeanien of the right adrenal gland. This may represent a benign


process. The possibility that this is secondary to metastatic


disease secondary to the large lung mass cannot be entirely


excluded however. If further imaging is desired, PET CT would be


recommended.





The bone windows show no sign of a fracture or of a destructive


lesion.





IMPRESSION:


1. There is a large mass along the medial aspect of the left


upper lobe. This should be considered neoplastic until proven


otherwise.


2. There is cardiomegaly and coronary artery disease and chronic


pulmonary disease. There is no acute cardiopulmonary abnormality


noted otherwise.


3. The nodule associated with the right adrenal gland may


represent a benign process. The possibility that this is


neoplastic in nature cannot be entirely excluded. Recommendations


as above.


4. The fluid collection in the right upper quadrant may be fluid


within the bowel. If further study is desired, then a complete CT


abdomen and pelvis exam should be obtained.





Assessment/Plan


Assessment/Plan


Admission Status:  Inpatient Order (span 2 midnights)


Assessment & Plan





Ms. Tobar is a 83F w/ a PMH of polio, DM, cholecystectomy, hip surgery who 

was admitted with septic shock and found to have a post-obstructive mass on CT 

chest and subsequently found to have PE on CTA. 





Septic shock


Pneumonia involving left lung


Mass of left lung


- Patient has completed 30 mg/kg fluids, VS stable, LA resolved\


- post obstructive mass on CT chest


- Started on Vanc/zosyn on admission


- BC grew coag neg gram positive cocci


>Continue vanc


>Pulmonology planning on doing bronch bx on , probably postponed in light 

of PE





 


PE 


-Pt developed ARF on night of  requiring new O2 need


- CTA showed PE


>Lovenox








Wound of left leg


-wound care consulted


>Concerned for possible SCC, planning for bx, not arranged yet





Localized swelling of both lower extremities


-received IV lasix on 


>Continue to monitor





Decubitus ulcer


>Wound ulcer





Diabetes


-A1c this admission 7.0


>SSI





HTN 


> holding meds d/t septic shock





Severe protein-calorie malnutrition


>Glucerna





Elevated INR


-on lovenox now for PE





Elevated bilirubin-resolved





Normocytic anemia


-Iron studies showed ACD


>CTM





Hypokalemia and hypomagnesemia


-replace and resolved





FEN: LR, replace lytes PRN, diabetic diet


PPx:lovenox


Code: Full


Dispo Continue admit to medicine





(1) Septic shock


Status:  Acute


(2) Pneumonia involving left lung


Status:  Acute


Qualifiers:  


   Qualified Codes:  J18.1 - Lobar pneumonia, unspecified organism


(3) Mass of left lung


Status:  Acute


(4) Wound of left leg


Status:  Acute


Qualifiers:  


   Qualified Codes:  S81.802A - Unspecified open wound, left lower leg, initial 

encounter


(5) Localized swelling of both lower extremities


Status:  Chronic


(6) Decubital ulcer


Status:  Chronic


Qualifiers:  


   Qualified Codes:  L89.159 - Pressure ulcer of sacral region, unspecified 

stage


(7) Diabetes


Status:  Chronic


Qualifiers:  


   Qualified Codes:  E11.9 - Type 2 diabetes mellitus without complications


(8) HTN (hypertension)


Status:  Chronic


Qualifiers:  


   Qualified Codes:  I10 - Essential (primary) hypertension


(9) Severe protein-calorie malnutrition


Status:  Chronic


(10) Elevated INR


Status:  Acute


(11) Elevated bilirubin


Status:  Resolved


(12) Normocytic anemia


Status:  Chronic


(13) Hypokalemia


Status:  Resolved


(14) Hypomagnesemia


Status:  Resolved





Clinical Quality Measures


DVT/VTE Risk/Contraindication:


Risk Factor Score Per Nursin


RFS Level Per Nursing on Admit:  4+=Very High





URSZULA CHRISTENSEN MD 18 2218:


Subjective


Subjective/Events-last exam


Patient had CTA this AM that showed possible PE vs pulmonary congestion and was 

started on anticoagulation.


Review of Systems


Date Seen by Provider:  Aug 29, 2018





Assessment/Plan


Assessment/Plan





(1) Septic shock


Status:  Acute


Assessment & Plan:  - Continue antibiotics to cover for PNA





(2) Pneumonia involving left lung


Status:  Acute


Qualifiers:  


   Qualified Codes:  J18.1 - Lobar pneumonia, unspecified organism


(3) Mass of left lung


Status:  Acute


Assessment & Plan:  - Plan for broch during this hospitalization, Dr Yost 

consulted 





(4) Diabetes


Status:  Chronic


Assessment & Plan:  - A1c pending


Qualifiers:  


   Qualified Codes:  E11.9 - Type 2 diabetes mellitus without complications


(5) Normocytic anemia


Status:  Chronic


Assessment & Plan:  - Patient transfused 1 units pRBCs





(6) HTN (hypertension)


Status:  Chronic


Assessment & Plan:  Holding home BP meds due to sepsis


Qualifiers:  


   Qualified Codes:  I10 - Essential (primary) hypertension


(7) Severe protein-calorie malnutrition


Status:  Chronic


(8) Elevated INR


Status:  Acute


Assessment & Plan:  - Given Vit K given elevated INR





(9) Wound of left leg


Status:  Acute


Qualifiers:  


   Qualified Codes:  S81.802A - Unspecified open wound, left lower leg, initial 

encounter


(10) Localized swelling of both lower extremities


Status:  Chronic


(11) Hypokalemia


Status:  Resolved


(12) Hypomagnesemia


Status:  Resolved











LESLEY ZHENG MEDICAL STUDENT Aug 29, 2018 14:42


URSZULA CHRISTENSEN MD Aug 29, 2018 22:18

## 2018-08-29 NOTE — DIAGNOSTIC IMAGING REPORT
INDICATION: 

Dyspnea.



COMPARISON: 

08/28/2018.



FINDINGS: 

The heart size is normal. There is a persistent large left

perihilar mass. There has been an interval increase in the

central pulmonary venous congestion. There is also some patchy

bibasilar atelectasis and/or pneumonitis, right greater than

left. There is no pneumothorax. The mediastinum is unremarkable.

The right internal jugular central venous catheter remains in

place. 



IMPRESSION: 

Increasing central pulmonary venous congestion as well as

increasing bibasilar atelectasis and/or pneumonitis.



Unchanged large left hilar mass.



Dictated by: 



  Dictated on workstation # [id number]

## 2018-08-29 NOTE — PULMONARY PROGRESS NOTE
Subjective


Time Seen by Provider:  05:27


Subjective/Events-last exam


Pt had respiratory failure throughout the night.





Sepsis Event


Evaluation


Height, Weight, BMI


Height: 5'5.00"


Weight: 135lbs. 3.0oz. 61.998260xv; 21.5 BMI


Method:Stated





Focused Exam


Lactate Level


8/27/18 13:00: Lactic Acid Level 5.05*H


8/27/18 15:05: Lactic Acid Level 2.29*H


8/27/18 18:05: Lactic Acid Level 1.38





Exam


Exam





Vital Signs








  Date Time  Temp Pulse Resp B/P (MAP) Pulse Ox O2 Delivery O2 Flow Rate FiO2


 


8/29/18 04:00  142 30 155/98 (117) 99 NIV Bilevel 50.00 


 


8/29/18 03:20 98.3 135  136/86  NIV Bilevel  50


 


8/29/18 03:00  141 37 149/98 (115) 100 NIV Bilevel 50.00 


 


8/29/18 02:52  146 41  100  50.00 


 


8/29/18 02:51      NIV Bilevel 50.00 


 


8/29/18 02:00  129 35 135/78 (97) 97 Nasal Cannula 2.00 


 


8/29/18 01:15      Nasal Cannula 2.00 


 


8/29/18 01:07 98.7 115 21 128/64 93 Room Air  


 


8/29/18 01:00  114 44 121/59 (79) 86 Room Air  


 


8/29/18 01:00  119      


 


8/29/18 00:52 98.6 117 22 106/81 94 Room Air  


 


8/29/18 00:00     94 Room Air  


 


8/29/18 00:00  116 45 106/81 (89) 90 Room Air  


 


8/28/18 23:00  115 33 130/70 (90) 96 Room Air  


 


8/28/18 22:00  121 27 139/66 (90) 94 Room Air  


 


8/28/18 21:00  123 31 136/66 (89) 99 Room Air  


 


8/28/18 20:00  116 35 133/63 (86) 95 Room Air  


 


8/28/18 20:00     96 Room Air  


 


8/28/18 19:00  107 27 129/73 (91) 100 Room Air  


 


8/28/18 19:00     100 Room Air  


 


8/28/18 19:00  117      


 


8/28/18 18:00  111 30 108/88 (95) 99 Room Air  


 


8/28/18 17:00  113 35 120/66 (84) 96 Room Air  


 


8/28/18 16:05     96 Room Air  


 


8/28/18 16:05 97.8     Room Air  


 


8/28/18 16:00  101 27 133/70 (91) 92 Room Air  


 


8/28/18 15:00  102 28 119/70 (86) 98 Room Air  


 


8/28/18 14:00  99 18 117/90 (99) 96 Room Air  


 


8/28/18 13:00  101 31 138/77 (97) 100 Room Air  


 


8/28/18 13:00  98      


 


8/28/18 12:29     100 Room Air  


 


8/28/18 12:28 97.9     Room Air  


 


8/28/18 12:00  93 15 125/80 (95) 97 Room Air  


 


8/28/18 11:00  101 19 129/62 (84) 98 Room Air  


 


8/28/18 10:00  90 11 113/64 (80) 97 Room Air  


 


8/28/18 09:00  96 28 115/86 (96) 98 Room Air  


 


8/28/18 08:21     100 Room Air  


 


8/28/18 08:18      Room Air  


 


8/28/18 08:18 97.6     Room Air  


 


8/28/18 08:00  91 19 120/90 (100) 99 Room Air  


 


8/28/18 07:00  96      


 


8/28/18 07:00  99 20 144/71 (95) 97 Room Air  


 


8/28/18 06:00  78 16 138/77 (97) 100 Room Air  














I & O 


 


 8/29/18





 07:00


 


Intake Total 3312.5 ml


 


Output Total 315 ml


 


Balance 2997.5 ml








Height & Weight


Height: 5'5.00"


Weight: 135lbs. 3.0oz. 61.352779vv; 21.5 BMI


Method:Stated


General Appearance:  Moderate Distress, Thin


HEENT:  PERRL/EOMI, Pharynx Normal


Neck:  Non Tender, Supple


Respiratory:  No Respiratory Distress, Crackles


Cardiovascular:  No Murmur, Tachycardia


Capillary Refill:  Less Than 3 Seconds


Gastrointestinal:  non tender, soft, no organomegaly; No hepatomegaly, No 

spleenomegaly


Extremity:  Normal Capillary Refill, Pedal Edema (2+ to the mid tibia bilateral)


Neurologic/Psychiatric:  Alert, Motor Weakness (global)


Skin:  Normal Color, Warm/Dry


Lymphatic:  No Adenopathy





Results


Lab


Laboratory Tests


8/27/18 13:00








8/27/18 17:15








8/28/18 03:15








8/28/18 12:15








8/29/18 04:30











Assessment/Plan


Assessment/Plan


Severe Sepsis secondary to Pneumonia vs UTI - BC Staphylococcus 


   -Continue Vanco and Zosyn - until cultures are finalized 


Acute respiratory distress through the night


   -Check CTA, bilateral dopplers 


   -Check EKG, troponins, BNP


Hx of hypothyroid with thyroidectomy 


   -Restart Synthroid


Sinus tach


   -restart home atenolol 


Pulmonary edema


   -PT was given 80mg of lasix last night per EICU 


Anemia


   -Pt transfused 1 units PRBC per EICU 


Large left mediastinal lung mass. 


   -bronchoscopy with fluoroscopy for Thursday morning 


Severe hypokalemia


   -replace 


Left leg wound/decub ulcer 


   -Tissue trauma 


Malnutrition    


   -Monitor 


   -Give multi Vitamin and Vit K secondary to elevated INR. It does not appear 

pt has been on any anticoagulation 


DM, HTN











ADELITA CAGLE DO Aug 29, 2018 05:09

## 2018-08-30 VITALS — DIASTOLIC BLOOD PRESSURE: 81 MMHG | SYSTOLIC BLOOD PRESSURE: 122 MMHG

## 2018-08-30 VITALS — DIASTOLIC BLOOD PRESSURE: 68 MMHG | SYSTOLIC BLOOD PRESSURE: 109 MMHG

## 2018-08-30 VITALS — SYSTOLIC BLOOD PRESSURE: 118 MMHG | DIASTOLIC BLOOD PRESSURE: 74 MMHG

## 2018-08-30 VITALS — DIASTOLIC BLOOD PRESSURE: 81 MMHG | SYSTOLIC BLOOD PRESSURE: 137 MMHG

## 2018-08-30 VITALS — DIASTOLIC BLOOD PRESSURE: 72 MMHG | SYSTOLIC BLOOD PRESSURE: 122 MMHG

## 2018-08-30 VITALS — DIASTOLIC BLOOD PRESSURE: 92 MMHG | SYSTOLIC BLOOD PRESSURE: 107 MMHG

## 2018-08-30 VITALS — SYSTOLIC BLOOD PRESSURE: 106 MMHG | DIASTOLIC BLOOD PRESSURE: 68 MMHG

## 2018-08-30 VITALS — SYSTOLIC BLOOD PRESSURE: 104 MMHG | DIASTOLIC BLOOD PRESSURE: 64 MMHG

## 2018-08-30 VITALS — DIASTOLIC BLOOD PRESSURE: 64 MMHG | SYSTOLIC BLOOD PRESSURE: 100 MMHG

## 2018-08-30 VITALS — SYSTOLIC BLOOD PRESSURE: 153 MMHG | DIASTOLIC BLOOD PRESSURE: 98 MMHG

## 2018-08-30 VITALS — SYSTOLIC BLOOD PRESSURE: 116 MMHG | DIASTOLIC BLOOD PRESSURE: 64 MMHG

## 2018-08-30 VITALS — DIASTOLIC BLOOD PRESSURE: 98 MMHG | SYSTOLIC BLOOD PRESSURE: 147 MMHG

## 2018-08-30 VITALS — DIASTOLIC BLOOD PRESSURE: 74 MMHG | SYSTOLIC BLOOD PRESSURE: 132 MMHG

## 2018-08-30 VITALS — SYSTOLIC BLOOD PRESSURE: 122 MMHG | DIASTOLIC BLOOD PRESSURE: 80 MMHG

## 2018-08-30 VITALS — SYSTOLIC BLOOD PRESSURE: 121 MMHG | DIASTOLIC BLOOD PRESSURE: 88 MMHG

## 2018-08-30 VITALS — DIASTOLIC BLOOD PRESSURE: 82 MMHG | SYSTOLIC BLOOD PRESSURE: 136 MMHG

## 2018-08-30 VITALS — SYSTOLIC BLOOD PRESSURE: 105 MMHG | DIASTOLIC BLOOD PRESSURE: 60 MMHG

## 2018-08-30 VITALS — SYSTOLIC BLOOD PRESSURE: 100 MMHG | DIASTOLIC BLOOD PRESSURE: 67 MMHG

## 2018-08-30 VITALS — SYSTOLIC BLOOD PRESSURE: 124 MMHG | DIASTOLIC BLOOD PRESSURE: 81 MMHG

## 2018-08-30 VITALS — SYSTOLIC BLOOD PRESSURE: 113 MMHG | DIASTOLIC BLOOD PRESSURE: 72 MMHG

## 2018-08-30 VITALS — DIASTOLIC BLOOD PRESSURE: 77 MMHG | SYSTOLIC BLOOD PRESSURE: 120 MMHG

## 2018-08-30 VITALS — DIASTOLIC BLOOD PRESSURE: 66 MMHG | SYSTOLIC BLOOD PRESSURE: 128 MMHG

## 2018-08-30 LAB
%HYPO/RBC NFR BLD AUTO: SLIGHT %
ALBUMIN SERPL-MCNC: 2.6 GM/DL (ref 3.2–4.5)
ALP SERPL-CCNC: 93 U/L (ref 40–136)
ALT SERPL-CCNC: 6 U/L (ref 0–55)
BASOPHILS # BLD AUTO: 0 10^3/UL (ref 0–0.1)
BASOPHILS NFR BLD AUTO: 0 % (ref 0–10)
BILIRUB SERPL-MCNC: 1.1 MG/DL (ref 0.1–1)
BUN/CREAT SERPL: 16
CALCIUM SERPL-MCNC: 8.3 MG/DL (ref 8.5–10.1)
CHLORIDE SERPL-SCNC: 105 MMOL/L (ref 98–107)
CO2 SERPL-SCNC: 28 MMOL/L (ref 21–32)
CREAT SERPL-MCNC: 0.79 MG/DL (ref 0.6–1.3)
EOSINOPHIL # BLD AUTO: 0.1 10^3/UL (ref 0–0.3)
EOSINOPHIL NFR BLD AUTO: 1 % (ref 0–10)
ERYTHROCYTE [DISTWIDTH] IN BLOOD BY AUTOMATED COUNT: 19 % (ref 10–14.5)
GFR SERPLBLD BASED ON 1.73 SQ M-ARVRAT: > 60 ML/MIN
GLUCOSE SERPL-MCNC: 138 MG/DL (ref 70–105)
HCT VFR BLD CALC: 29 % (ref 35–52)
HGB BLD-MCNC: 8.7 G/DL (ref 11.5–16)
LYMPHOCYTES # BLD AUTO: 1.5 X 10^3 (ref 1–4)
LYMPHOCYTES NFR BLD AUTO: 10 % (ref 12–44)
MAGNESIUM SERPL-MCNC: 1.4 MG/DL (ref 1.8–2.4)
MANUAL DIFFERENTIAL PERFORMED BLD QL: YES
MCH RBC QN AUTO: 26 PG (ref 25–34)
MCHC RBC AUTO-ENTMCNC: 30 G/DL (ref 32–36)
MCV RBC AUTO: 85 FL (ref 80–99)
MONOCYTES # BLD AUTO: 0.8 X 10^3 (ref 0–1)
MONOCYTES NFR BLD AUTO: 5 % (ref 0–12)
MONOCYTES NFR BLD: 4 %
NEUTROPHILS # BLD AUTO: 12.7 X 10^3 (ref 1.8–7.8)
NEUTROPHILS NFR BLD AUTO: 84 % (ref 42–75)
NEUTS BAND NFR BLD MANUAL: 87 %
PHOSPHATE SERPL-MCNC: 2.8 MG/DL (ref 2.3–4.7)
PLATELET # BLD: 245 10^3/UL (ref 130–400)
PMV BLD AUTO: 9.5 FL (ref 7.4–10.4)
POTASSIUM SERPL-SCNC: 3.5 MMOL/L (ref 3.6–5)
PROT SERPL-MCNC: 5.4 GM/DL (ref 6.4–8.2)
RBC # BLD AUTO: 3.41 10^6/UL (ref 4.35–5.85)
SODIUM SERPL-SCNC: 146 MMOL/L (ref 135–145)
VARIANT LYMPHS NFR BLD MANUAL: 9 %
WBC # BLD AUTO: 15 10^3/UL (ref 4.3–11)

## 2018-08-30 RX ADMIN — INSULIN ASPART SCH UNIT: 100 INJECTION, SOLUTION INTRAVENOUS; SUBCUTANEOUS at 12:41

## 2018-08-30 RX ADMIN — Medication SCH EA: at 04:56

## 2018-08-30 RX ADMIN — ALBUTEROL SULFATE SCH MG: 2.5 SOLUTION RESPIRATORY (INHALATION) at 19:38

## 2018-08-30 RX ADMIN — ENOXAPARIN SODIUM SCH MG: 100 INJECTION SUBCUTANEOUS at 17:09

## 2018-08-30 RX ADMIN — ATENOLOL SCH MG: 50 TABLET ORAL at 08:38

## 2018-08-30 RX ADMIN — SODIUM CHLORIDE SCH MLS/HR: 900 INJECTION INTRAVENOUS at 14:56

## 2018-08-30 RX ADMIN — ALBUTEROL SULFATE SCH MG: 2.5 SOLUTION RESPIRATORY (INHALATION) at 21:32

## 2018-08-30 RX ADMIN — SODIUM CHLORIDE SCH MLS/HR: 900 INJECTION INTRAVENOUS at 21:24

## 2018-08-30 RX ADMIN — POTASSIUM CHLORIDE SCH MLS/HR: 200 INJECTION, SOLUTION INTRAVENOUS at 05:06

## 2018-08-30 RX ADMIN — ALBUTEROL SULFATE SCH MG: 2.5 SOLUTION RESPIRATORY (INHALATION) at 07:00

## 2018-08-30 RX ADMIN — ENOXAPARIN SODIUM SCH MG: 100 INJECTION SUBCUTANEOUS at 05:06

## 2018-08-30 RX ADMIN — POTASSIUM CHLORIDE SCH MLS/HR: 200 INJECTION, SOLUTION INTRAVENOUS at 04:55

## 2018-08-30 RX ADMIN — PANTOPRAZOLE SODIUM SCH MG: 40 INJECTION, POWDER, FOR SOLUTION INTRAVENOUS at 08:20

## 2018-08-30 RX ADMIN — POTASSIUM CHLORIDE SCH MLS/HR: 200 INJECTION, SOLUTION INTRAVENOUS at 06:05

## 2018-08-30 RX ADMIN — FUROSEMIDE SCH MG: 10 INJECTION, SOLUTION INTRAVENOUS at 21:23

## 2018-08-30 RX ADMIN — MORPHINE SULFATE PRN MG: 4 INJECTION, SOLUTION INTRAMUSCULAR; INTRAVENOUS at 05:22

## 2018-08-30 RX ADMIN — ALBUTEROL SULFATE SCH MG: 2.5 SOLUTION RESPIRATORY (INHALATION) at 14:39

## 2018-08-30 RX ADMIN — MAGNESIUM SULFATE IN DEXTROSE SCH MLS/HR: 10 INJECTION, SOLUTION INTRAVENOUS at 06:05

## 2018-08-30 RX ADMIN — MAGNESIUM SULFATE IN DEXTROSE SCH MLS/HR: 10 INJECTION, SOLUTION INTRAVENOUS at 04:55

## 2018-08-30 RX ADMIN — MAGNESIUM SULFATE IN DEXTROSE SCH MLS/HR: 10 INJECTION, SOLUTION INTRAVENOUS at 05:07

## 2018-08-30 RX ADMIN — FUROSEMIDE SCH MG: 10 INJECTION, SOLUTION INTRAVENOUS at 12:12

## 2018-08-30 RX ADMIN — INSULIN ASPART SCH UNIT: 100 INJECTION, SOLUTION INTRAVENOUS; SUBCUTANEOUS at 17:08

## 2018-08-30 RX ADMIN — SODIUM CHLORIDE SCH MLS/HR: 900 INJECTION, SOLUTION INTRAVENOUS at 08:25

## 2018-08-30 RX ADMIN — POTASSIUM CHLORIDE SCH MEQ: 1500 TABLET, EXTENDED RELEASE ORAL at 04:56

## 2018-08-30 RX ADMIN — SODIUM CHLORIDE SCH MLS/HR: 900 INJECTION INTRAVENOUS at 05:07

## 2018-08-30 RX ADMIN — INSULIN ASPART SCH UNIT: 100 INJECTION, SOLUTION INTRAVENOUS; SUBCUTANEOUS at 04:56

## 2018-08-30 NOTE — PULMONARY PROGRESS NOTE
Subjective


Time Seen by Provider:  07:27


Subjective/Events-last exam


pT has had increased SOB and tachycardia through the night.





Sepsis Event


Evaluation


Height, Weight, BMI


Height: 5'5.00"


Weight: 145lbs. 3.0oz. 65.083834ux; 21.5 BMI


Method:Stated





Focused Exam


Lactate Level


8/27/18 13:00: Lactic Acid Level 5.05*H


8/27/18 15:05: Lactic Acid Level 2.29*H


8/27/18 18:05: Lactic Acid Level 1.38





Exam


Exam





Vital Signs








  Date Time  Temp Pulse Resp B/P (MAP) Pulse Ox O2 Delivery O2 Flow Rate FiO2


 


8/30/18 06:56  86 20  92  40.00 


 


8/30/18 06:00  92 17 122/72 (89) 93 NIV Bilevel 40.00 


 


8/30/18 05:25      NIV Bilevel 40.00 


 


8/30/18 05:23      Nasal Cannula 4.00 


 


8/30/18 05:00  122 27 147/98 (114) 93 Nasal Cannula 2.00 


 


8/30/18 04:00  111 31 137/81 (99) 95 Nasal Cannula 2.00 


 


8/30/18 03:50 98.1 117 22  95 Nasal Cannula 2.00 


 


8/30/18 03:50     93 Nasal Cannula 2.00 


 


8/30/18 03:00  108 20 107/92 (97) 94 Nasal Cannula 2.00 


 


8/30/18 03:00  105 27 107/92 (97) 94 Nasal Cannula 2.00 


 


8/30/18 02:00  86 17 122/81 (95) 99 Nasal Cannula 2.00 


 


8/30/18 01:17  89      


 


8/30/18 01:00  99 20 118/74 (89) 100 Nasal Cannula 2.00 


 


8/30/18 00:00  112 22 116/64 (81) 93 Nasal Cannula 2.00 


 


8/29/18 23:55     93 Nasal Cannula 2.00 


 


8/29/18 23:50 97.9       


 


8/29/18 23:00  91 17 114/73 (87) 99 Nasal Cannula 2.00 


 


8/29/18 22:00  92 14 122/70 (87) 99 Nasal Cannula 2.00 


 


8/29/18 21:00     100 Nasal Cannula 3.00 


 


8/29/18 21:00  92 17 109/65 (80) 100 Nasal Cannula 2.00 


 


8/29/18 21:00  96 15 112/68 (83) 97 Nasal Cannula 3.00 


 


8/29/18 20:00     99 Nasal Cannula 3.00 


 


8/29/18 20:00  96 18 114/93 (100) 99 Nasal Cannula 3.00 


 


8/29/18 20:00  96  114/93 (100)    


 


8/29/18 19:50     100 Nasal Cannula 5.00 


 


8/29/18 19:03  88      


 


8/29/18 19:00 97.2 92 17 107/66 (80) 100 Nasal Cannula 3.00 


 


8/29/18 18:00  91 17  100 Nasal Cannula 4.00 


 


8/29/18 17:00  104 20 129/76 (93) 100 Nasal Cannula 4.00 


 


8/29/18 16:00 97.6       


 


8/29/18 16:00  101 15 96/66 (76) 100 Nasal Cannula 4.00 


 


8/29/18 16:00     97 NIV Bilevel 4.00 


 


8/29/18 15:00  105 17 96/66 (76) 98 Nasal Cannula 4.00 


 


8/29/18 14:30  112 24  96  40.00 


 


8/29/18 14:00  138 29 122/66 (84) 93 Nasal Cannula 4.00 


 


8/29/18 13:00  101 20 125/96 (106) 100 Nasal Cannula 4.00 


 


8/29/18 13:00  101      


 


8/29/18 12:00  117 30 131/90 (104) 93 Nasal Cannula 4.00 


 


8/29/18 12:00     97 NIV Bilevel 4.00 


 


8/29/18 12:00 98.2       


 


8/29/18 11:00  107 22 112/91 (98) 97 Nasal Cannula 4.00 


 


8/29/18 10:00  110 22 112/97 (102) 100 Nasal Cannula 4.00 





        


 


8/29/18 09:00  103 27 129/79 (96) 100 NIV Bilevel 50.00 


 


8/29/18 08:00  98 15 134/88 (103) 100 NIV Bilevel 50.00 


 


8/29/18 08:00     95 NIV Bilevel  50














I & O 


 


 8/30/18





 07:00


 


Intake Total 1630 ml


 


Output Total 4100 ml


 


Balance -2470 ml








Height & Weight


Height: 5'5.00"


Weight: 145lbs. 3.0oz. 65.113909np; 21.5 BMI


Method:Stated


General Appearance:  Moderate Distress, Thin


HEENT:  PERRL/EOMI, Pharynx Normal


Neck:  Non Tender, Supple


Respiratory:  Lungs Clear, Normal Breath Sounds, No Accessory Muscle Use


Cardiovascular:  Regular Rate, Rhythm, No Edema, No Murmur, Normal Peripheral 

Pulses


Capillary Refill:  Less Than 3 Seconds


Gastrointestinal:  non tender, soft, no organomegaly; No hepatomegaly, No 

spleenomegaly


Extremity:  Normal Capillary Refill, Pedal Edema (2+ to the mid tibia bilateral)


Neurologic/Psychiatric:  Alert, Motor Weakness (global)


Skin:  Normal Color, Warm/Dry


Lymphatic:  No Adenopathy





Results


Lab


Laboratory Tests


8/28/18 12:15








8/29/18 04:30








8/30/18 04:00











Assessment/Plan


Assessment/Plan


Severe Sepsis secondary to Pneumonia vs UTI - BC Staphylococcus 


   -Continue Vanco and Zosyn - until cultures are finalized 


Acute respiratory distress through the night


   -CTA is suggestive of PE 


      -Lovenox started 


Hx of hypothyroid with thyroidectomy 


   - Synthroid IV secondary to pt not being able to take PO 


Sinus tach


   -restart home atenolol 


Pulmonary edema


   -Lasix 


Anemia S/p 1 unit of PRBC 


Large left mediastinal lung mass. 


   -bronchoscopy with fluoroscopy for Thursday morning -- Bronch cancelled 

secondary to acute PE and pt is too unstable 


Severe hypokalemia


   -replace 


Left leg wound/decub ulcer 


   -Tissue trauma 


Malnutrition    


   -Monitor 


DM, HTN











ADELITA CAGLE DO Aug 30, 2018 07:25

## 2018-08-30 NOTE — PROGRESS NOTE (SOAP)
[facility] MEDICAL STUDENT 18 1125:


Subjective


Subjective/Events-last exam


Ms. Tobar is reporting that her breathing is improved this morning. She 

continues to report confusion about what is going on although she is AOx2. Dr. Gillette discussed with her possible options given her current status and she was 

understanding of what hospice is. She had no acute concerns this morning.


Review of Systems


Time Seen by Provider:  09:45


General:  No Chills, No Night Sweats


Pulmonary:  No Dyspnea, No Pleuritic Chest Pain


Cardiovascular:  No: Chest Pain


Gastrointestinal:  Other (No BM since admission); No: Nausea, Vomiting





Focused Exam


Lactate Level


18 13:00: Lactic Acid Level 5.05*H


18 15:05: Lactic Acid Level 2.29*H


18 18:05: Lactic Acid Level 1.38





Objective


Exam


Last Set of Vital Signs





Vital Signs








  Date Time  Temp Pulse Resp B/P (MAP) Pulse Ox O2 Delivery O2 Flow Rate FiO2


 


18 10:42  79      100


 


18 10:38     100 Nasal Cannula  


 


18 09:00   19 122/80 (94)   4.00 


 


18 08:00 98.4       





Capillary Refill : Less Than 3 SecondsLess Than 3 Seconds


I&O











Intake and Output 


 


 18





 00:00


 


Intake Total 1580 ml


 


Output Total 4435 ml


 


Balance -2855 ml


 


 


 


Intake Oral 80 ml


 


IV Total 1500 ml


 


Output Urine Total 4435 ml








General:  Alert, Other (Oriented to place and time)


HEENT:  Atraumatic, EOMI


Lungs:  Clear to Auscultation


Heart:  Regular Rate, Normal S1, Normal S2, No Murmurs


Abdomen:  Normal Bowel Sounds, Soft, No Tenderness


Extremities:  Normal Pulses, Other (L leg lesion wrapped in hieu)





Results/Procedures


Lab


Laboratory Tests


18 11:33: D-Dimer 2.36H


18 12:03: Troponin I < 0.30


18 13:31: Lab Scanned Report Transfusion Reaction Form


18 16:30: 


Blood Gas Puncture Site RIGHT BRACHIAL, Blood Gas Patient Temperature 97.9, 

Arterial Blood pH 7.44H, Arterial Blood Partial Pressure CO2 46H, Arterial 

Blood Partial Pressure O2 73L, Arterial Blood HCO3 31H, Arterial Blood Total 

CO2 32.3H, Arterial Blood Oxygen Saturation 96, Arterial Blood Base Excess 6.6H

, Serge Test NA, Blood Gas Ventilator Setting NO, Blood Gas Inspired Oxygen 5L 

NC


18 16:48: Glucometer 257H


18 18:50: Troponin I < 0.30


18 23:50: Glucometer 131H


18 04:00: 


White Blood Count 15.0H, Red Blood Count 3.41L, Hemoglobin 8.7L, Hematocrit 29L

, Mean Corpuscular Volume 85, Mean Corpuscular Hemoglobin 26, Mean Corpuscular 

Hemoglobin Concent 30L, Red Cell Distribution Width 19.0H, Platelet Count 245, 

Mean Platelet Volume 9.5, Neutrophils (%) (Auto) 84H, Lymphocytes (%) (Auto) 10L

, Monocytes (%) (Auto) 5, Eosinophils (%) (Auto) 1, Basophils (%) (Auto) 0, 

Neutrophils # (Auto) 12.7H, Lymphocytes # (Auto) 1.5, Monocytes # (Auto) 0.8, 

Eosinophils # (Auto) 0.1, Basophils # (Auto) 0.0, Neutrophils % (Manual) 87, 

Lymphocytes % (Manual) 9, Monocytes % (Manual) 4, Hypochromasia SLIGHT, Sodium 

Level 146H, Potassium Level 3.5L, Chloride Level 105, Carbon Dioxide Level 28, 

Anion Gap 13, Blood Urea Nitrogen 13, Creatinine 0.79, Estimat Glomerular 

Filtration Rate > 60, BUN/Creatinine Ratio 16, Glucose Level 138H, Calcium 

Level 8.3L, Corrected Calcium 9.4, Phosphorus Level 2.8, Magnesium Level 1.4L, 

Total Bilirubin 1.1H, Aspartate Amino Transf (AST/SGOT) 11, Alanine 

Aminotransferase (ALT/SGPT) 6, Alkaline Phosphatase 93, Total Protein 5.4L, 

Albumin 2.6L


18 08:41: Glucometer 181H





Microbiology


18 Blood Culture - Preliminary, Resulted


          Staph, Coag Neg (CNS)


18 MRSA Screen - Final, Complete


          MRSA not isolated


18 Urine Culture - Final, Complete


          NO GROWTH


Radiology


Date of Exam:   18





CT CHEST W


 





PROCEDURE: CT chest with contrast only.





TECHNIQUE: Multiple contiguous axial images were obtained through


the chest after administration of intravenous contrast.





INDICATION: Cough, fever





COMPARISON: There are no previous CT chest examinations available


for comparison.





FINDINGS:  


The plain film examination of the chest performed earlier today


at 1:23 PM noted a fullness in the left hilum raising the


question of a neoplastic mass in this area. On this exam, there


is indeed a sizable 5.4 x 6.6 x 6.1 CM heterogeneous mass along


the medial aspect of the left upper lung. This mass should be


considered neoplastic until proven otherwise.





The heart is mildly enlarged. There are coronary calcifications


evident. The aorta is not abnormally dilated and there is no sign


of dissection. The pulmonary arteries were not well opacified and


consequently difficult to assess for a pulmonary embolus. 





There is no mediastinal or hilar adenopathy. There are chronic


pulmonary changes evident and there are coarse interstitial


densities about both carol ann and in both lung bases, particularly on


the right.





There is no obvious breast mass.





The sections through the upper abdomen fail to show any sign of


an acute abnormality. There are surgical clips about the


gallbladder fossa consistent with a prior cholecystectomy. There


appears to be a small collection of fluid in this area. This may


be fluid within the bowel as opposed to a free fluid collection.





Also, there is a 1.3 x 1.4 CM nodule associated with the medial


jeanine of the right adrenal gland. This may represent a benign


process. The possibility that this is secondary to metastatic


disease secondary to the large lung mass cannot be entirely


excluded however. If further imaging is desired, PET CT would be


recommended.





The bone windows show no sign of a fracture or of a destructive


lesion.





IMPRESSION:


1. There is a large mass along the medial aspect of the left


upper lobe. This should be considered neoplastic until proven


otherwise.


2. There is cardiomegaly and coronary artery disease and chronic


pulmonary disease. There is no acute cardiopulmonary abnormality


noted otherwise.


3. The nodule associated with the right adrenal gland may


represent a benign process. The possibility that this is


neoplastic in nature cannot be entirely excluded. Recommendations


as above.


4. The fluid collection in the right upper quadrant may be fluid


within the bowel. If further study is desired, then a complete CT


abdomen and pelvis exam should be obtained.





Assessment/Plan


Assessment/Plan


Admission Status:  Inpatient Order (span 2 midnights)


Assessment & Plan





Ms. Tobar is a 83F w/ a PMH of polio, DM, cholecystectomy, hip surgery who 

was admitted with septic shock and found to have a post-obstructive mass on CT 

chest and subsequently found to have PE on CTA. 





Septic shock


Pneumonia involving left lung


Mass of left lung


- Patient has completed 30 mg/kg fluids, VS stable, LA resolved


- post obstructive mass on CT chest


- Started on Vanc/zosyn on admission


- BC grew coag neg gram positive cocci


>Continue vanc, stop zosyn


>Pulmonology planned on doing bronch bx on , cancelled d/t PE


>Family meeting currently being planned given current health status and 

continued deterioration 





 


PE 


-Pt developed ARF on night of  requiring new O2 need


- CTA showed PE


>Lovenox





Sinus Tachycardia


>Restarted PTA atenolol





Wound of left leg


-wound care consulted


>Concerned for possible SCC, planning for bx, not arranged yet





Localized swelling of both lower extremities


-received IV lasix on 


>IV lasix 40mg x1 





Decubitus ulcer


>Wound ulcer





Diabetes


-A1c this admission 7.0


>SSI





HTN 


> holding meds d/t septic shock





Hypothyroidism


-PTA synthroid


>IV synthroid d/t pt not able to tolerate PO intake





Severe protein-calorie malnutrition


>Glucerna





Elevated INR


-on lovenox now for PE





Elevated bilirubin-resolved





Normocytic anemia


-Iron studies showed ACD


>CTM





Hypokalemia and hypomagnesemia


-replace PRN





FEN: LR, replace lytes PRN, diabetic diet


PPx:lovenox


Code: Full


Dispo Continue admit to medicine





(1) Septic shock


Status:  Acute


(2) Pneumonia involving left lung


Status:  Acute


Qualifiers:  


   Qualified Codes:  J18.1 - Lobar pneumonia, unspecified organism


(3) Mass of left lung


Status:  Acute


(4) Diabetes


Status:  Chronic


Qualifiers:  


   Qualified Codes:  E11.9 - Type 2 diabetes mellitus without complications


(5) Normocytic anemia


Status:  Chronic


(6) HTN (hypertension)


Status:  Chronic


Qualifiers:  


   Qualified Codes:  I10 - Essential (primary) hypertension


(7) Severe protein-calorie malnutrition


Status:  Chronic


(8) Elevated INR


Status:  Acute


(9) Wound of left leg


Status:  Acute


Qualifiers:  


   Qualified Codes:  S81.802A - Unspecified open wound, left lower leg, initial 

encounter


(10) Localized swelling of both lower extremities


Status:  Chronic


(11) Hypokalemia


Status:  Resolved


(12) Hypomagnesemia


Status:  Resolved





Clinical Quality Measures


DVT/VTE Risk/Contraindication:


Risk Factor Score Per Nursin


RFS Level Per Nursing on Admit:  4+=Very High





URSZULA GILLETTE MD 18 1541:


Subjective


Subjective/Events-last exam


Patient denies any pain this AM. States that she is still short of breath. 

Nursing states that she was coughing with sips of water and breathing has 

become more labored throughout the night and AM.


Review of Systems


Date Seen by Provider:  Aug 30, 2018





Objective


Exam


General:  Alert


HEENT:  Other (dry MM)


Lungs:  Other (Diffuse crackles, increased work of breathing)


Heart:  Regular Rate, No Murmurs


Abdomen:  Normal Bowel Sounds, Soft, No Tenderness, No Hepatosplenomegaly, No 

Masses


Extremities:  Other (L leg lesion wrapped in hieu, 3+ pitting edema)





Assessment/Plan


Assessment/Plan


Assessment & Plan


Agree with above assessment and plan with the exception of what is noted below.





84 yo F admitted for Septic shock that shows deterioration in the last few 

days. Patient not stable to undergo bronch of lung mass, which due to size and 

progression of disease is concerning for malignancy. Patient is currently on 

therapeutic lovenox for probable PE and requiring bipap. Patient is NPO at this 

time due to concerns for aspiration. Speech has been consulted. Discussed code 

status with patient and she states that she wishes to be DNR. Family meeting 

tomorrow to discuss goals of care as patient is moving toward comfort care vs 

hospice. Above plan has been discussed with Dr Yost.











[facility] MEDICAL STUDENT Aug 30, 2018 11:25


URSZULA GILLETTE MD Aug 30, 2018 15:41

## 2018-08-30 NOTE — DIAGNOSTIC IMAGING REPORT
Indication: Dyspnea.



Comparison made with prior examination of 08/29/2018.



Findings: Heart size is stable. There is unchanged left hilar

mass. There is some venous congestion. Some right basilar

atelectasis and/or pneumonitis. No pneumothorax. Right internal

jugular central venous catheter remains in satisfactory position.





Impression: Persistent large left hilar mass.



Mild venous congestion and some right basilar atelectasis and/or

pneumonitis.



Dictated by: 



  Dictated on workstation # [id number]

## 2018-08-30 NOTE — DIAGNOSTIC IMAGING REPORT
Indication: Septic shock.



Compared with exam earlier this same day, this film is timed 8:12

a.m. 



Findings:  Medial left upper chest mass redemonstrated. The right

IJ catheter is at the SVC. Bilateral infiltrates and/or edema

showed no change, heart size upper limit stable, right IJ at the

SVC, small amounts of pleural fluid showed no obvious change. 



Impression:  Left chest mass redemonstrated 5 lobe interstitial

opacities, edema versus pneumonia with small effusions all

unchanged.



Dictated by: 



  Dictated on workstation # [id number]

## 2018-08-31 VITALS — DIASTOLIC BLOOD PRESSURE: 95 MMHG | SYSTOLIC BLOOD PRESSURE: 125 MMHG

## 2018-08-31 VITALS — SYSTOLIC BLOOD PRESSURE: 99 MMHG | DIASTOLIC BLOOD PRESSURE: 67 MMHG

## 2018-08-31 VITALS — DIASTOLIC BLOOD PRESSURE: 64 MMHG | SYSTOLIC BLOOD PRESSURE: 115 MMHG

## 2018-08-31 VITALS — SYSTOLIC BLOOD PRESSURE: 128 MMHG | DIASTOLIC BLOOD PRESSURE: 92 MMHG

## 2018-08-31 VITALS — DIASTOLIC BLOOD PRESSURE: 79 MMHG | SYSTOLIC BLOOD PRESSURE: 126 MMHG

## 2018-08-31 VITALS — SYSTOLIC BLOOD PRESSURE: 131 MMHG | DIASTOLIC BLOOD PRESSURE: 93 MMHG

## 2018-08-31 VITALS — DIASTOLIC BLOOD PRESSURE: 64 MMHG | SYSTOLIC BLOOD PRESSURE: 126 MMHG

## 2018-08-31 VITALS — SYSTOLIC BLOOD PRESSURE: 139 MMHG | DIASTOLIC BLOOD PRESSURE: 94 MMHG

## 2018-08-31 VITALS — DIASTOLIC BLOOD PRESSURE: 61 MMHG | SYSTOLIC BLOOD PRESSURE: 102 MMHG

## 2018-08-31 VITALS — DIASTOLIC BLOOD PRESSURE: 72 MMHG | SYSTOLIC BLOOD PRESSURE: 108 MMHG

## 2018-08-31 VITALS — DIASTOLIC BLOOD PRESSURE: 79 MMHG | SYSTOLIC BLOOD PRESSURE: 124 MMHG

## 2018-08-31 VITALS — DIASTOLIC BLOOD PRESSURE: 77 MMHG | SYSTOLIC BLOOD PRESSURE: 118 MMHG

## 2018-08-31 VITALS — DIASTOLIC BLOOD PRESSURE: 88 MMHG | SYSTOLIC BLOOD PRESSURE: 134 MMHG

## 2018-08-31 VITALS — SYSTOLIC BLOOD PRESSURE: 131 MMHG | DIASTOLIC BLOOD PRESSURE: 94 MMHG

## 2018-08-31 VITALS — SYSTOLIC BLOOD PRESSURE: 138 MMHG | DIASTOLIC BLOOD PRESSURE: 84 MMHG

## 2018-08-31 VITALS — DIASTOLIC BLOOD PRESSURE: 92 MMHG | SYSTOLIC BLOOD PRESSURE: 127 MMHG

## 2018-08-31 VITALS — DIASTOLIC BLOOD PRESSURE: 84 MMHG | SYSTOLIC BLOOD PRESSURE: 138 MMHG

## 2018-08-31 VITALS — DIASTOLIC BLOOD PRESSURE: 50 MMHG | SYSTOLIC BLOOD PRESSURE: 91 MMHG

## 2018-08-31 VITALS — SYSTOLIC BLOOD PRESSURE: 104 MMHG | DIASTOLIC BLOOD PRESSURE: 56 MMHG

## 2018-08-31 VITALS — SYSTOLIC BLOOD PRESSURE: 108 MMHG | DIASTOLIC BLOOD PRESSURE: 73 MMHG

## 2018-08-31 VITALS — SYSTOLIC BLOOD PRESSURE: 137 MMHG | DIASTOLIC BLOOD PRESSURE: 88 MMHG

## 2018-08-31 VITALS — SYSTOLIC BLOOD PRESSURE: 130 MMHG | DIASTOLIC BLOOD PRESSURE: 78 MMHG

## 2018-08-31 VITALS — SYSTOLIC BLOOD PRESSURE: 114 MMHG | DIASTOLIC BLOOD PRESSURE: 62 MMHG

## 2018-08-31 VITALS — DIASTOLIC BLOOD PRESSURE: 85 MMHG | SYSTOLIC BLOOD PRESSURE: 131 MMHG

## 2018-08-31 VITALS — SYSTOLIC BLOOD PRESSURE: 133 MMHG | DIASTOLIC BLOOD PRESSURE: 67 MMHG

## 2018-08-31 VITALS — SYSTOLIC BLOOD PRESSURE: 127 MMHG | DIASTOLIC BLOOD PRESSURE: 92 MMHG

## 2018-08-31 VITALS — DIASTOLIC BLOOD PRESSURE: 89 MMHG | SYSTOLIC BLOOD PRESSURE: 126 MMHG

## 2018-08-31 LAB
ALBUMIN SERPL-MCNC: 2.3 GM/DL (ref 3.2–4.5)
ALP SERPL-CCNC: 96 U/L (ref 40–136)
ALT SERPL-CCNC: 6 U/L (ref 0–55)
BASOPHILS # BLD AUTO: 0 10^3/UL (ref 0–0.1)
BASOPHILS NFR BLD AUTO: 0 % (ref 0–10)
BILIRUB SERPL-MCNC: 0.8 MG/DL (ref 0.1–1)
BUN/CREAT SERPL: 18
CALCIUM SERPL-MCNC: 8.4 MG/DL (ref 8.5–10.1)
CHLORIDE SERPL-SCNC: 104 MMOL/L (ref 98–107)
CO2 SERPL-SCNC: 30 MMOL/L (ref 21–32)
CREAT SERPL-MCNC: 0.85 MG/DL (ref 0.6–1.3)
EOSINOPHIL # BLD AUTO: 0 10^3/UL (ref 0–0.3)
EOSINOPHIL NFR BLD AUTO: 0 % (ref 0–10)
ERYTHROCYTE [DISTWIDTH] IN BLOOD BY AUTOMATED COUNT: 19.9 % (ref 10–14.5)
GFR SERPLBLD BASED ON 1.73 SQ M-ARVRAT: > 60 ML/MIN
GLUCOSE SERPL-MCNC: 135 MG/DL (ref 70–105)
HCT VFR BLD CALC: 29 % (ref 35–52)
HGB BLD-MCNC: 8.5 G/DL (ref 11.5–16)
LYMPHOCYTES # BLD AUTO: 1.2 X 10^3 (ref 1–4)
LYMPHOCYTES NFR BLD AUTO: 8 % (ref 12–44)
MAGNESIUM SERPL-MCNC: 1.7 MG/DL (ref 1.8–2.4)
MANUAL DIFFERENTIAL PERFORMED BLD QL: NO
MCH RBC QN AUTO: 25 PG (ref 25–34)
MCHC RBC AUTO-ENTMCNC: 29 G/DL (ref 32–36)
MCV RBC AUTO: 85 FL (ref 80–99)
MONOCYTES # BLD AUTO: 0.8 X 10^3 (ref 0–1)
MONOCYTES NFR BLD AUTO: 6 % (ref 0–12)
NEUTROPHILS # BLD AUTO: 13 X 10^3 (ref 1.8–7.8)
NEUTROPHILS NFR BLD AUTO: 87 % (ref 42–75)
PHOSPHATE SERPL-MCNC: 4.2 MG/DL (ref 2.3–4.7)
PLATELET # BLD: 241 10^3/UL (ref 130–400)
PMV BLD AUTO: 9.5 FL (ref 7.4–10.4)
POTASSIUM SERPL-SCNC: 2.9 MMOL/L (ref 3.6–5)
PROT SERPL-MCNC: 5.2 GM/DL (ref 6.4–8.2)
RBC # BLD AUTO: 3.43 10^6/UL (ref 4.35–5.85)
SODIUM SERPL-SCNC: 147 MMOL/L (ref 135–145)
VANCOMYCIN TROUGH SERPL-MCNC: 22.7 UG/ML (ref 10–20)
WBC # BLD AUTO: 15.1 10^3/UL (ref 4.3–11)

## 2018-08-31 RX ADMIN — VANCOMYCIN HYDROCHLORIDE SCH MLS/HR: 750 INJECTION, POWDER, LYOPHILIZED, FOR SOLUTION INTRAVENOUS at 11:37

## 2018-08-31 RX ADMIN — POTASSIUM CHLORIDE SCH MLS/HR: 200 INJECTION, SOLUTION INTRAVENOUS at 08:05

## 2018-08-31 RX ADMIN — ALBUTEROL SULFATE SCH MG: 2.5 SOLUTION RESPIRATORY (INHALATION) at 23:00

## 2018-08-31 RX ADMIN — SODIUM CHLORIDE SCH MLS/HR: 900 INJECTION INTRAVENOUS at 23:04

## 2018-08-31 RX ADMIN — ALBUTEROL SULFATE SCH MG: 2.5 SOLUTION RESPIRATORY (INHALATION) at 19:03

## 2018-08-31 RX ADMIN — SODIUM CHLORIDE SCH MLS/HR: 900 INJECTION INTRAVENOUS at 14:22

## 2018-08-31 RX ADMIN — MAGNESIUM SULFATE IN DEXTROSE SCH MLS/HR: 10 INJECTION, SOLUTION INTRAVENOUS at 06:22

## 2018-08-31 RX ADMIN — Medication SCH EA: at 06:34

## 2018-08-31 RX ADMIN — POTASSIUM CHLORIDE SCH MLS/HR: 200 INJECTION, SOLUTION INTRAVENOUS at 10:06

## 2018-08-31 RX ADMIN — POTASSIUM CHLORIDE SCH MLS/HR: 200 INJECTION, SOLUTION INTRAVENOUS at 06:33

## 2018-08-31 RX ADMIN — POTASSIUM CHLORIDE SCH MLS/HR: 200 INJECTION, SOLUTION INTRAVENOUS at 11:30

## 2018-08-31 RX ADMIN — ENOXAPARIN SODIUM SCH MG: 100 INJECTION SUBCUTANEOUS at 05:40

## 2018-08-31 RX ADMIN — POTASSIUM CHLORIDE SCH MLS/HR: 200 INJECTION, SOLUTION INTRAVENOUS at 06:34

## 2018-08-31 RX ADMIN — ALBUTEROL SULFATE SCH MG: 2.5 SOLUTION RESPIRATORY (INHALATION) at 15:03

## 2018-08-31 RX ADMIN — POTASSIUM CHLORIDE SCH MEQ: 1500 TABLET, EXTENDED RELEASE ORAL at 06:22

## 2018-08-31 RX ADMIN — INSULIN ASPART SCH UNIT: 100 INJECTION, SOLUTION INTRAVENOUS; SUBCUTANEOUS at 06:23

## 2018-08-31 RX ADMIN — POTASSIUM CHLORIDE SCH MLS/HR: 200 INJECTION, SOLUTION INTRAVENOUS at 06:22

## 2018-08-31 RX ADMIN — FUROSEMIDE SCH MG: 10 INJECTION, SOLUTION INTRAVENOUS at 08:08

## 2018-08-31 RX ADMIN — PANTOPRAZOLE SODIUM SCH MG: 40 INJECTION, POWDER, FOR SOLUTION INTRAVENOUS at 08:08

## 2018-08-31 RX ADMIN — ALBUTEROL SULFATE SCH MG: 2.5 SOLUTION RESPIRATORY (INHALATION) at 02:26

## 2018-08-31 RX ADMIN — INSULIN ASPART SCH UNIT: 100 INJECTION, SOLUTION INTRAVENOUS; SUBCUTANEOUS at 18:30

## 2018-08-31 RX ADMIN — INSULIN ASPART SCH UNIT: 100 INJECTION, SOLUTION INTRAVENOUS; SUBCUTANEOUS at 11:30

## 2018-08-31 RX ADMIN — ALBUTEROL SULFATE SCH MG: 2.5 SOLUTION RESPIRATORY (INHALATION) at 10:47

## 2018-08-31 RX ADMIN — SODIUM CHLORIDE SCH MLS/HR: 900 INJECTION INTRAVENOUS at 05:41

## 2018-08-31 RX ADMIN — POTASSIUM CHLORIDE SCH MLS/HR: 200 INJECTION, SOLUTION INTRAVENOUS at 11:31

## 2018-08-31 RX ADMIN — ENOXAPARIN SODIUM SCH MG: 100 INJECTION SUBCUTANEOUS at 18:30

## 2018-08-31 RX ADMIN — ALBUTEROL SULFATE SCH MG: 2.5 SOLUTION RESPIRATORY (INHALATION) at 06:45

## 2018-08-31 RX ADMIN — ATENOLOL SCH MG: 50 TABLET ORAL at 08:08

## 2018-08-31 RX ADMIN — INSULIN ASPART SCH UNIT: 100 INJECTION, SOLUTION INTRAVENOUS; SUBCUTANEOUS at 00:24

## 2018-08-31 NOTE — PROGRESS NOTE (SOAP)
[facility] 11[facility]23am:


Subjective


Subjective/Events-last exam


Pt voiced again this morning confusion about what is going on. I explained to 

her again her current status is worrisome for further decline. I informed her 

of the family meeting that is scheduled today to discuss further options. She 

had no acute concerns.


Review of Systems


Time Seen by Provider:  10:15


General:  No Chills, No Night Sweats; Fatigue


Pulmonary:  Dyspnea; No Cough, No Pleuritic Chest Pain


Cardiovascular:  No: Chest Pain


Gastrointestinal:  No: Nausea, Vomiting, Abdominal Pain





Objective


Exam


Last Set of Vital Signs





Vital Signs








  Date Time  Temp Pulse Resp B/P (MAP) Pulse Ox O2 Delivery O2 Flow Rate FiO2


 


18 11:00  92 34 134/88 (103) 97 High Flow N/C 3.00 


 


18 08:06 97.9       


 


18 08:00        40





Capillary Refill : Less Than 3 SecondsLess Than 3 Seconds


I&O











Intake and Output 


 


 18





 00:00


 


Intake Total 850 ml


 


Output Total 1050 ml


 


Balance -200 ml


 


 


 


Intake Oral 0 ml


 


IV Total 850 ml


 


Output Urine Total 1050 ml








General:  Alert, Oriented X3, Cooperative, No Acute Distress


HEENT:  Atraumatic, EOMI


Lungs:  Clear to Auscultation, Normal Air Movement


Heart:  Regular Rate, Normal S1, Normal S2, No Murmurs


Abdomen:  Normal Bowel Sounds, Soft, No Tenderness


Extremities:  Normal Pulses, Other (BL LE 3+ edema)


Skin:  Other (L LE lesion in gauze)





Results/Procedures


Lab


Laboratory Tests


18 12:12: Glucometer 196H


18 17:07: Glucometer 143H


18 05:40: 


White Blood Count 15.1H, Red Blood Count 3.43L, Hemoglobin 8.5L, Hematocrit 29L

, Mean Corpuscular Volume 85, Mean Corpuscular Hemoglobin 25, Mean Corpuscular 

Hemoglobin Concent 29L, Red Cell Distribution Width 19.9H, Platelet Count 241, 

Mean Platelet Volume 9.5, Neutrophils (%) (Auto) 87H, Lymphocytes (%) (Auto) 8L

, Monocytes (%) (Auto) 6, Eosinophils (%) (Auto) 0, Basophils (%) (Auto) 0, 

Neutrophils # (Auto) 13.0H, Lymphocytes # (Auto) 1.2, Monocytes # (Auto) 0.8, 

Eosinophils # (Auto) 0.0, Basophils # (Auto) 0.0, Sodium Level 147H, Potassium 

Level 2.9L, Chloride Level 104, Carbon Dioxide Level 30, Anion Gap 13, Blood 

Urea Nitrogen 15, Creatinine 0.85, Estimat Glomerular Filtration Rate > 60, BUN/

Creatinine Ratio 18, Glucose Level 135H, Calcium Level 8.4L, Corrected Calcium 

9.8, Phosphorus Level 4.2, Magnesium Level 1.7L, Total Bilirubin 0.8, Aspartate 

Amino Transf (AST/SGOT) 8, Alanine Aminotransferase (ALT/SGPT) 6, Alkaline 

Phosphatase 96, B-Type Natriuretic Peptide 2675.8H, Total Protein 5.2L, Albumin 

2.3L, Vancomycin Level Trough 22.7H





Microbiology


18 Blood Culture - Preliminary, Resulted


          Staph, Coag Neg (CNS)


18 MRSA Screen - Final, Complete


          MRSA not isolated


18 Urine Culture - Final, Complete


          NO GROWTH


Radiology


Date of Exam:   18





CT CHEST W


 





PROCEDURE: CT chest with contrast only.





TECHNIQUE: Multiple contiguous axial images were obtained through


the chest after administration of intravenous contrast.





INDICATION: Cough, fever





COMPARISON: There are no previous CT chest examinations available


for comparison.





FINDINGS:  


The plain film examination of the chest performed earlier today


at 1:23 PM noted a fullness in the left hilum raising the


question of a neoplastic mass in this area. On this exam, there


is indeed a sizable 5.4 x 6.6 x 6.1 CM heterogeneous mass along


the medial aspect of the left upper lung. This mass should be


considered neoplastic until proven otherwise.





The heart is mildly enlarged. There are coronary calcifications


evident. The aorta is not abnormally dilated and there is no sign


of dissection. The pulmonary arteries were not well opacified and


consequently difficult to assess for a pulmonary embolus. 





There is no mediastinal or hilar adenopathy. There are chronic


pulmonary changes evident and there are coarse interstitial


densities about both carol ann and in both lung bases, particularly on


the right.





There is no obvious breast mass.





The sections through the upper abdomen fail to show any sign of


an acute abnormality. There are surgical clips about the


gallbladder fossa consistent with a prior cholecystectomy. There


appears to be a small collection of fluid in this area. This may


be fluid within the bowel as opposed to a free fluid collection.





Also, there is a 1.3 x 1.4 CM nodule associated with the medial


jeanine of the right adrenal gland. This may represent a benign


process. The possibility that this is secondary to metastatic


disease secondary to the large lung mass cannot be entirely


excluded however. If further imaging is desired, PET CT would be


recommended.





The bone windows show no sign of a fracture or of a destructive


lesion.





IMPRESSION:


1. There is a large mass along the medial aspect of the left


upper lobe. This should be considered neoplastic until proven


otherwise.


2. There is cardiomegaly and coronary artery disease and chronic


pulmonary disease. There is no acute cardiopulmonary abnormality


noted otherwise.


3. The nodule associated with the right adrenal gland may


represent a benign process. The possibility that this is


neoplastic in nature cannot be entirely excluded. Recommendations


as above.


4. The fluid collection in the right upper quadrant may be fluid


within the bowel. If further study is desired, then a complete CT


abdomen and pelvis exam should be obtained.





Assessment/Plan


Assessment/Plan


Assessment & Plan


Ms. Tobar is a 83F w/ a PMH of polio, DM, cholecystectomy, hip surgery who 

was admitted with septic shock and found to have a post-obstructive mass on CT 

chest and subsequently found to have PE on CTA. 





Septic shock


Pneumonia involving left lung


Mass of left lung


- Patient has completed 30 mg/kg fluids, VS stable, LA resolved


- post obstructive mass on CT chest


- Started on Vanc/zosyn on admission


- BC grew coag neg gram positive cocci


-Pulmonology planned on doing bronch bx on , cancelled d/t PE


>Continue vanc, zosyn


>Family meeting later this afternoon given current health status and continued 

deterioration 





 


PE 


-Pt developed ARF on night of  requiring new O2 need


- CTA showed PE


>Lovenox


>BiPAP





Sinus Tachycardia


>Restarted PTA atenolol





Wound of left leg


-wound care consulted


>Concerned for possible SCC, planning for bx, not arranged yet





Localized swelling of both lower extremities


-received IV lasix on 


>IV lasix 40mg BID


>Spironolactone





Decubitus ulcer


>Wound ulcer





Diabetes


-A1c this admission 7.0


>SSI





HTN 


> holding meds d/t septic shock





Hypothyroidism


-PTA synthroid


>IV synthroid d/t pt not able to tolerate PO intake





Severe protein-calorie malnutrition


>Glucerna





Elevated INR


-on lovenox now for PE





Elevated bilirubin-resolved





Normocytic anemia


-Iron studies showed ACD


>CTM





Hypokalemia and hypomagnesemia


-replace PRN





FEN: LR, replace lytes PRN, diabetic diet


PPx:lovenox


Code: Full


Dispo Continue admit to medicine





(1) Septic shock


Status:  Acute


(2) Pneumonia involving left lung


Status:  Acute


Qualifiers:  


   Qualified Codes:  J18.1 - Lobar pneumonia, unspecified organism


(3) Mass of left lung


Status:  Acute


(4) Diabetes


Status:  Chronic


Qualifiers:  


   Qualified Codes:  E11.9 - Type 2 diabetes mellitus without complications


(5) Normocytic anemia


Status:  Chronic


(6) HTN (hypertension)


Status:  Chronic


Qualifiers:  


   Qualified Codes:  I10 - Essential (primary) hypertension


(7) Severe protein-calorie malnutrition


Status:  Chronic


(8) Elevated INR


Status:  Acute


(9) Wound of left leg


Status:  Acute


Qualifiers:  


   Qualified Codes:  S81.802A - Unspecified open wound, left lower leg, initial 

encounter


(10) Localized swelling of both lower extremities


Status:  Chronic


(11) Hypokalemia


Status:  Resolved


(12) Hypomagnesemia


Status:  Resolved





Clinical Quality Measures


DVT/VTE Risk/Contraindication:


Risk Factor Score Per Nursin


RFS Level Per Nursing on Admit:  4+=Very High





URSZULA CHRISTENSEN MD 18 3:02pm:


Subjective


Subjective/Events-last exam


Patient denied any concerns. Denies pain at this time.


Review of Systems


Date Seen by Provider:  Aug 31, 2018





Objective


Exam


General:  Alert, Oriented X3, Cooperative, No Acute Distress


Lungs:  Other (Diminished breath sounds)


Heart:  Regular Rate, No Murmurs


Abdomen:  Normal Bowel Sounds, Soft, No Tenderness


Extremities:  Other (BL LE 3+ edema)


Skin:  Other (L LE lesion in gauze)


Psych/Mental Status:  Mood NL





Assessment/Plan


Assessment/Plan


Assessment & Plan


Patient seen and examined with Lesley Zheng MS4, Agree with above documentation











LESLEY ZHENG MEDICAL STUDENT Aug 31, 2018 11:23 am


URSZULA CHRISTENSEN MD Aug 31, 2018 3:02 pm

## 2018-08-31 NOTE — PULMONARY PROGRESS NOTE
Subjective


Time Seen by Provider:  06:14


Subjective/Events-last exam


PT on BiPAP. Family is discussing possible hospice care.





Sepsis Event


Evaluation


Height, Weight, BMI


Height: 5'5.00"


Weight: 145lbs. 3.0oz. 65.823703wc; 21.5 BMI


Method:Stated





Exam


Exam





Vital Signs








  Date Time  Temp Pulse Resp B/P (MAP) Pulse Ox O2 Delivery O2 Flow Rate FiO2


 


8/31/18 04:00     100 NIV Bilevel  40


 


8/31/18 04:00  68 14 126/64 (84) 100 NIV Bilevel 35.00 


 


8/31/18 03:00  64 20 104/56 (72) 100 NIV Bilevel 35.00 


 


8/31/18 02:26  66 16  97  35.00 


 


8/31/18 02:00  63 15 102/61 (75) 100 NIV Bilevel 35.00 


 


8/31/18 01:00  83 23 131/93 (106) 100 NIV Bilevel 35.00 


 


8/31/18 01:00  85      


 


8/31/18 00:00     100 NIV Bilevel  40


 


8/31/18 00:00  62 16 99/67 (78) 100 NIV Bilevel 35.00 


 


8/31/18 00:00 97.5       


 


8/30/18 23:00  71 21 120/77 (91) 100 NIV Bilevel 35.00 


 


8/30/18 22:00  65 24 105/60 (75) 100 NIV Bilevel 35.00 


 


8/30/18 21:33  69 15  100  40.00 


 


8/30/18 21:00  66 16 132/74 (93) 100 NIV Bilevel 35.00 


 


8/30/18 20:00 98.0 62 17 100/67 (78) 100 NIV Bilevel 40.00 


 


8/30/18 20:00     100 NIV Bilevel  40


 


8/30/18 19:00  62      


 


8/30/18 19:00  64 17 100/64 (76) 100 NIV Bilevel 40.00 


 


8/30/18 18:00  103 19 106/68 (81) 97 NIV Bilevel 40.00 


 


8/30/18 17:00  79 14 104/64 (77) 95 NIV Bilevel 40.00 


 


8/30/18 16:41     100 NIV Bilevel  40


 


8/30/18 16:00  87 15 121/88 (99) 95 NIV Bilevel 40.00 


 


8/30/18 15:10      NIV Bilevel 40.00 


 


8/30/18 15:06  113 24  99  40.00 


 


8/30/18 14:39     90 Nasal Cannula 3.00 


 


8/30/18 13:00  75      


 


8/30/18 12:43 97.7       


 


8/30/18 12:14     100 Nasal Cannula 4.00 


 


8/30/18 10:42  79      100


 


8/30/18 10:38     100 Nasal Cannula 4.00 


 


8/30/18 09:00  96 19 122/80 (94) 99 High Flow N/C 4.00 


 


8/30/18 08:38     96 High Flow N/C 4.00 


 


8/30/18 08:30     96 Nasal Cannula 4.00 


 


8/30/18 08:00 98.4       


 


8/30/18 08:00  87 34 136/82 (100) 95 High Flow N/C 4.00 


 


8/30/18 07:00  90      


 


8/30/18 07:00  85 20 128/66 (86) 92   


 


8/30/18 06:56  86 20  92  40.00 














I & O 


 


 8/31/18





 06:59


 


Intake Total 250 ml


 


Output Total 1025 ml


 


Balance -775 ml








Height & Weight


Height: 5'5.00"


Weight: 145lbs. 3.0oz. 65.954874pm; 21.5 BMI


Method:Stated


General Appearance:  Moderate Distress, Thin


HEENT:  PERRL/EOMI, Pharynx Normal


Neck:  Non Tender, Supple


Respiratory:  Lungs Clear, Normal Breath Sounds, No Accessory Muscle Use


Cardiovascular:  Regular Rate, Rhythm, No Edema, No Murmur, Normal Peripheral 

Pulses


Capillary Refill:  Less Than 3 Seconds


Gastrointestinal:  non tender, soft, no organomegaly; No hepatomegaly, No 

spleenomegaly


Extremity:  Normal Capillary Refill, Pedal Edema (2+ to the mid tibia bilateral)


Neurologic/Psychiatric:  Alert, Motor Weakness (global)


Skin:  Normal Color, Warm/Dry


Lymphatic:  No Adenopathy





Results


Lab


Laboratory Tests


8/30/18 04:00








8/31/18 05:40











Assessment/Plan


Assessment/Plan


Severe Sepsis secondary to Pneumonia vs UTI - BC Staphylococcus 


   -Continue Vanco and Zosyn - until cultures are finalized 


Acute respiratory distress through the night


   -BiPAP 


   -CTA is suggestive of PE 


      -Lovenox started 


Pulmonary edema 


   -Continue Lasix IV 40mg BID 


   -Check BNP 


severe hypokalemia


   -Give 40PO KCL


   -60 KCL IV 


   -Start spironolactone


   -repeat chem 2hrs after IV infusion


   -PT is getting 40mg of lasix BID 


Persistent leukocytosis - afebrial 


   -No steroids ordered 


   -Continue to monitor close 


Hx of hypothyroid with thyroidectomy 


   - Synthroid IV secondary to pt not being able to take PO 





Pulmonary edema


   -Lasix 


Anemia S/p 1 unit of PRBC 


Large left mediastinal lung mass. 


   - Bronch cancelled secondary to acute PE and pt is too unstable 


Severe hypokalemia


   -replace 


Left leg wound/decub ulcer 


   -Tissue trauma 


Malnutrition    


   -Monitor 


DM, HTN








Family is considering hospice care. Pt's prognosis is overall very poor. Will 

do bronchoscopy if family consents when pt is stable enough.











ADELITA CAGLE DO Aug 31, 2018 06:08

## 2018-08-31 NOTE — DIAGNOSTIC IMAGING REPORT
INDICATION: Sepsis.



COMPARISON:  08/30/2018.



FINDINGS: Single view of the chest demonstrates left hilar lung

mass. The heart is slightly enlarged with persistent but

decreased central vascular congestion. There are small effusions

with dependent atelectasis in both bases. There is no

pneumothorax. Right IJ catheter is stable.



IMPRESSION:

1. Cardiac enlargement with persistent but decreased central

vascular congestion.

2. Unchanged bibasilar atelectasis, effusion and infiltrate.

3. Left hilar mass.



Dictated by: 



  Dictated on workstation # [id number]

## 2018-09-01 VITALS — DIASTOLIC BLOOD PRESSURE: 72 MMHG | SYSTOLIC BLOOD PRESSURE: 133 MMHG

## 2018-09-01 VITALS — SYSTOLIC BLOOD PRESSURE: 92 MMHG | DIASTOLIC BLOOD PRESSURE: 55 MMHG

## 2018-09-01 VITALS — DIASTOLIC BLOOD PRESSURE: 76 MMHG | SYSTOLIC BLOOD PRESSURE: 113 MMHG

## 2018-09-01 VITALS — SYSTOLIC BLOOD PRESSURE: 100 MMHG | DIASTOLIC BLOOD PRESSURE: 64 MMHG

## 2018-09-01 VITALS — DIASTOLIC BLOOD PRESSURE: 80 MMHG | SYSTOLIC BLOOD PRESSURE: 110 MMHG

## 2018-09-01 VITALS — DIASTOLIC BLOOD PRESSURE: 73 MMHG | SYSTOLIC BLOOD PRESSURE: 94 MMHG

## 2018-09-01 VITALS — DIASTOLIC BLOOD PRESSURE: 58 MMHG | SYSTOLIC BLOOD PRESSURE: 98 MMHG

## 2018-09-01 VITALS — SYSTOLIC BLOOD PRESSURE: 101 MMHG | DIASTOLIC BLOOD PRESSURE: 61 MMHG

## 2018-09-01 VITALS — DIASTOLIC BLOOD PRESSURE: 86 MMHG | SYSTOLIC BLOOD PRESSURE: 133 MMHG

## 2018-09-01 VITALS — SYSTOLIC BLOOD PRESSURE: 103 MMHG | DIASTOLIC BLOOD PRESSURE: 76 MMHG

## 2018-09-01 VITALS — DIASTOLIC BLOOD PRESSURE: 61 MMHG | SYSTOLIC BLOOD PRESSURE: 121 MMHG

## 2018-09-01 VITALS — SYSTOLIC BLOOD PRESSURE: 116 MMHG | DIASTOLIC BLOOD PRESSURE: 68 MMHG

## 2018-09-01 VITALS — SYSTOLIC BLOOD PRESSURE: 125 MMHG | DIASTOLIC BLOOD PRESSURE: 82 MMHG

## 2018-09-01 VITALS — DIASTOLIC BLOOD PRESSURE: 89 MMHG | SYSTOLIC BLOOD PRESSURE: 115 MMHG

## 2018-09-01 VITALS — SYSTOLIC BLOOD PRESSURE: 121 MMHG | DIASTOLIC BLOOD PRESSURE: 66 MMHG

## 2018-09-01 VITALS — DIASTOLIC BLOOD PRESSURE: 69 MMHG | SYSTOLIC BLOOD PRESSURE: 111 MMHG

## 2018-09-01 VITALS — SYSTOLIC BLOOD PRESSURE: 113 MMHG | DIASTOLIC BLOOD PRESSURE: 67 MMHG

## 2018-09-01 VITALS — DIASTOLIC BLOOD PRESSURE: 90 MMHG | SYSTOLIC BLOOD PRESSURE: 150 MMHG

## 2018-09-01 VITALS — SYSTOLIC BLOOD PRESSURE: 103 MMHG | DIASTOLIC BLOOD PRESSURE: 64 MMHG

## 2018-09-01 VITALS — SYSTOLIC BLOOD PRESSURE: 121 MMHG | DIASTOLIC BLOOD PRESSURE: 69 MMHG

## 2018-09-01 LAB
ALBUMIN SERPL-MCNC: 2.6 GM/DL (ref 3.2–4.5)
ALP SERPL-CCNC: 144 U/L (ref 40–136)
ALT SERPL-CCNC: 8 U/L (ref 0–55)
BASOPHILS # BLD AUTO: 0 10^3/UL (ref 0–0.1)
BASOPHILS NFR BLD AUTO: 0 % (ref 0–10)
BILIRUB SERPL-MCNC: 1.1 MG/DL (ref 0.1–1)
BUN/CREAT SERPL: 20
CALCIUM SERPL-MCNC: 8.5 MG/DL (ref 8.5–10.1)
CHLORIDE SERPL-SCNC: 108 MMOL/L (ref 98–107)
CO2 SERPL-SCNC: 28 MMOL/L (ref 21–32)
CREAT SERPL-MCNC: 1.2 MG/DL (ref 0.6–1.3)
EOSINOPHIL # BLD AUTO: 0 10^3/UL (ref 0–0.3)
EOSINOPHIL NFR BLD AUTO: 0 % (ref 0–10)
ERYTHROCYTE [DISTWIDTH] IN BLOOD BY AUTOMATED COUNT: 20.4 % (ref 10–14.5)
GFR SERPLBLD BASED ON 1.73 SQ M-ARVRAT: 43 ML/MIN
GLUCOSE SERPL-MCNC: 153 MG/DL (ref 70–105)
HCT VFR BLD CALC: 28 % (ref 35–52)
HGB BLD-MCNC: 8.3 G/DL (ref 11.5–16)
LYMPHOCYTES # BLD AUTO: 1.6 X 10^3 (ref 1–4)
LYMPHOCYTES NFR BLD AUTO: 13 % (ref 12–44)
MAGNESIUM SERPL-MCNC: 1.7 MG/DL (ref 1.8–2.4)
MANUAL DIFFERENTIAL PERFORMED BLD QL: NO
MCH RBC QN AUTO: 26 PG (ref 25–34)
MCHC RBC AUTO-ENTMCNC: 30 G/DL (ref 32–36)
MCV RBC AUTO: 86 FL (ref 80–99)
MONOCYTES # BLD AUTO: 0.7 X 10^3 (ref 0–1)
MONOCYTES NFR BLD AUTO: 6 % (ref 0–12)
NEUTROPHILS # BLD AUTO: 10.2 X 10^3 (ref 1.8–7.8)
NEUTROPHILS NFR BLD AUTO: 81 % (ref 42–75)
PHOSPHATE SERPL-MCNC: 4.3 MG/DL (ref 2.3–4.7)
PLATELET # BLD: 224 10^3/UL (ref 130–400)
PMV BLD AUTO: 9.8 FL (ref 7.4–10.4)
POTASSIUM SERPL-SCNC: 4.4 MMOL/L (ref 3.6–5)
PROT SERPL-MCNC: 5.1 GM/DL (ref 6.4–8.2)
RBC # BLD AUTO: 3.24 10^6/UL (ref 4.35–5.85)
SODIUM SERPL-SCNC: 149 MMOL/L (ref 135–145)
WBC # BLD AUTO: 12.6 10^3/UL (ref 4.3–11)

## 2018-09-01 RX ADMIN — MAGNESIUM SULFATE IN DEXTROSE SCH MLS/HR: 10 INJECTION, SOLUTION INTRAVENOUS at 06:42

## 2018-09-01 RX ADMIN — PANTOPRAZOLE SODIUM SCH MG: 40 INJECTION, POWDER, FOR SOLUTION INTRAVENOUS at 09:47

## 2018-09-01 RX ADMIN — POTASSIUM CHLORIDE SCH MLS/HR: 200 INJECTION, SOLUTION INTRAVENOUS at 06:06

## 2018-09-01 RX ADMIN — INSULIN ASPART SCH UNIT: 100 INJECTION, SOLUTION INTRAVENOUS; SUBCUTANEOUS at 06:07

## 2018-09-01 RX ADMIN — POTASSIUM CHLORIDE SCH MEQ: 1500 TABLET, EXTENDED RELEASE ORAL at 06:07

## 2018-09-01 RX ADMIN — ATENOLOL SCH MG: 50 TABLET ORAL at 09:30

## 2018-09-01 RX ADMIN — LEVOTHYROXINE SODIUM ANHYDROUS SCH MCG: 100 INJECTION, POWDER, LYOPHILIZED, FOR SOLUTION INTRAVENOUS at 09:47

## 2018-09-01 RX ADMIN — INSULIN ASPART SCH UNIT: 100 INJECTION, SOLUTION INTRAVENOUS; SUBCUTANEOUS at 01:12

## 2018-09-01 RX ADMIN — SODIUM CHLORIDE, SODIUM LACTATE, POTASSIUM CHLORIDE, AND CALCIUM CHLORIDE SCH MLS/HR: 600; 310; 30; 20 INJECTION, SOLUTION INTRAVENOUS at 06:41

## 2018-09-01 RX ADMIN — ALBUTEROL SULFATE SCH MG: 2.5 SOLUTION RESPIRATORY (INHALATION) at 02:42

## 2018-09-01 RX ADMIN — ENOXAPARIN SODIUM SCH MG: 100 INJECTION SUBCUTANEOUS at 06:25

## 2018-09-01 RX ADMIN — MAGNESIUM SULFATE IN DEXTROSE SCH MLS/HR: 10 INJECTION, SOLUTION INTRAVENOUS at 06:06

## 2018-09-01 RX ADMIN — INSULIN ASPART SCH UNIT: 100 INJECTION, SOLUTION INTRAVENOUS; SUBCUTANEOUS at 12:31

## 2018-09-01 RX ADMIN — MAGNESIUM SULFATE IN DEXTROSE SCH MLS/HR: 10 INJECTION, SOLUTION INTRAVENOUS at 08:55

## 2018-09-01 RX ADMIN — LORAZEPAM PRN MG: 2 INJECTION INTRAMUSCULAR; INTRAVENOUS at 22:00

## 2018-09-01 RX ADMIN — SODIUM CHLORIDE SCH MLS/HR: 900 INJECTION INTRAVENOUS at 06:24

## 2018-09-01 RX ADMIN — ALBUTEROL SULFATE SCH MG: 2.5 SOLUTION RESPIRATORY (INHALATION) at 10:13

## 2018-09-01 RX ADMIN — Medication SCH EA: at 09:47

## 2018-09-01 RX ADMIN — VANCOMYCIN HYDROCHLORIDE SCH MLS/HR: 750 INJECTION, POWDER, LYOPHILIZED, FOR SOLUTION INTRAVENOUS at 12:08

## 2018-09-01 RX ADMIN — ALBUTEROL SULFATE SCH MG: 2.5 SOLUTION RESPIRATORY (INHALATION) at 06:29

## 2018-09-01 NOTE — DIAGNOSTIC IMAGING REPORT
INDICATION: Septic shock and shortness of breath.



Comparison made with prior examination 08/31/2018.



FINDINGS: Heart size is normal. The persistent left perihilar

mass. There is moderate venous congestion. There are small

bilateral pleural effusions. There is no pneumothorax.



IMPRESSION: Persistent left hilar mass.



Moderate central pulmonary venous congestion with bilateral

pleural effusions left greater than right.



Dictated by: 



  Dictated on workstation # [id number]

## 2018-09-01 NOTE — PROGRESS NOTE (SOAP)
Subjective


Subjective/Events-last exam


Patient has no complaints at the time of exam.  She is sleepy, but awakes to 

voice and is able to answer simple questions.  She reports she feels a little 

better than yesterday, and she feels her breathing has gotten a bit better.  

She was able to feed herself a few bites of lunch.  She and her family met with 

[facility] this morning, and they have decided to proceed with hospice 

care.  They desire placement in a facility for end of life care, and are aware 

that this will likely not be able to take place until Tuesday due to the 

holiday weekend.


Review of Systems


Date Seen by Provider:  Sep 1, 2018


Time Seen by Provider:  12:40


General:  No Chills; Fatigue


HEENT:  No Head Aches, No Eye Pain


Pulmonary:  Dyspnea


Cardiovascular:  Edema; No: Chest Pain, Palpitations


Gastrointestinal:  No: Nausea, Vomiting


Neurological:  No: Seizures





Objective


Exam


Last Set of Vital Signs





Vital Signs








  Date Time  Temp Pulse Resp B/P (MAP) Pulse Ox O2 Delivery O2 Flow Rate FiO2


 


18 09:00  58 20 101/61 (74) 100 Nasal Cannula 2.00 


 


18 08:00 97.4       


 


18 04:00        35





Capillary Refill : Less Than 3 SecondsLess Than 3 Seconds


I&O











Intake and Output 


 


 18





 00:00


 


Intake Total 0 ml


 


Output Total 955 ml


 


Balance -955 ml


 


 


 


Intake Oral 0 ml


 


Output Urine Total 955 ml








General:  Alert, Cooperative, No Acute Distress


HEENT:  Atraumatic, EOMI, Mucous Memb Moist/Pink


Neck:  Supple


Lungs:  Other (diminished with poor air exchange, wheezing)


Heart:  Regular Rate, Normal S1, Normal S2


Abdomen:  Normal Bowel Sounds, Soft, No Tenderness


Extremities:  No Cyanosis, Other (significant lower extremity edema)


Neuro:  Normal Speech, Normal Tone


Psych/Mental Status:  Mental Status NL, Mood NL





Results/Procedures


Lab


Laboratory Tests


18 11:28: Glucometer 137H


18 14:10: Potassium Level 5.7H


18 18:30: Glucometer 135H


18 18:35: Potassium Level 5.3H


18 01:08: Glucometer 198H


18 04:30: 


White Blood Count 12.6H, Red Blood Count 3.24L, Hemoglobin 8.3L, Hematocrit 28L

, Mean Corpuscular Volume 86, Mean Corpuscular Hemoglobin 26, Mean Corpuscular 

Hemoglobin Concent 30L, Red Cell Distribution Width 20.4H, Platelet Count 224, 

Mean Platelet Volume 9.8, Neutrophils (%) (Auto) 81H, Lymphocytes (%) (Auto) 13

, Monocytes (%) (Auto) 6, Eosinophils (%) (Auto) 0, Basophils (%) (Auto) 0, 

Neutrophils # (Auto) 10.2H, Lymphocytes # (Auto) 1.6, Monocytes # (Auto) 0.7, 

Eosinophils # (Auto) 0.0, Basophils # (Auto) 0.0, Sodium Level 149H, Potassium 

Level 4.4, Chloride Level 108H, Carbon Dioxide Level 28, Anion Gap 13, Blood 

Urea Nitrogen 24H, Creatinine 1.20, Estimat Glomerular Filtration Rate 43, BUN/

Creatinine Ratio 20, Glucose Level 153H, Calcium Level 8.5, Corrected Calcium 

9.6, Phosphorus Level 4.3, Magnesium Level 1.7L, Total Bilirubin 1.1H, 

Aspartate Amino Transf (AST/SGOT) 20, Alanine Aminotransferase (ALT/SGPT) 8, 

Alkaline Phosphatase 144H, B-Type Natriuretic Peptide 3008.4H, Total Protein 

5.1L, Albumin 2.6L





Microbiology


18 Blood Culture - Preliminary, Resulted


          Staph, Coag Neg (CNS)


18 MRSA Screen - Final, Complete


          MRSA not isolated


18 Urine Culture - Final, Complete


          NO GROWTH


Radiology


Date of Exam:   18





CT CHEST W


 





PROCEDURE: CT chest with contrast only.





TECHNIQUE: Multiple contiguous axial images were obtained through


the chest after administration of intravenous contrast.





INDICATION: Cough, fever





COMPARISON: There are no previous CT chest examinations available


for comparison.





FINDINGS:  


The plain film examination of the chest performed earlier today


at 1:23 PM noted a fullness in the left hilum raising the


question of a neoplastic mass in this area. On this exam, there


is indeed a sizable 5.4 x 6.6 x 6.1 CM heterogeneous mass along


the medial aspect of the left upper lung. This mass should be


considered neoplastic until proven otherwise.





The heart is mildly enlarged. There are coronary calcifications


evident. The aorta is not abnormally dilated and there is no sign


of dissection. The pulmonary arteries were not well opacified and


consequently difficult to assess for a pulmonary embolus. 





There is no mediastinal or hilar adenopathy. There are chronic


pulmonary changes evident and there are coarse interstitial


densities about both carol ann and in both lung bases, particularly on


the right.





There is no obvious breast mass.





The sections through the upper abdomen fail to show any sign of


an acute abnormality. There are surgical clips about the


gallbladder fossa consistent with a prior cholecystectomy. There


appears to be a small collection of fluid in this area. This may


be fluid within the bowel as opposed to a free fluid collection.





Also, there is a 1.3 x 1.4 CM nodule associated with the medial


jeanine of the right adrenal gland. This may represent a benign


process. The possibility that this is secondary to metastatic


disease secondary to the large lung mass cannot be entirely


excluded however. If further imaging is desired, PET CT would be


recommended.





The bone windows show no sign of a fracture or of a destructive


lesion.





IMPRESSION:


1. There is a large mass along the medial aspect of the left


upper lobe. This should be considered neoplastic until proven


otherwise.


2. There is cardiomegaly and coronary artery disease and chronic


pulmonary disease. There is no acute cardiopulmonary abnormality


noted otherwise.


3. The nodule associated with the right adrenal gland may


represent a benign process. The possibility that this is


neoplastic in nature cannot be entirely excluded. Recommendations


as above.


4. The fluid collection in the right upper quadrant may be fluid


within the bowel. If further study is desired, then a complete CT


abdomen and pelvis exam should be obtained.





Assessment/Plan


Assessment/Plan





(1) Comfort measures only status


Assessment & Plan:  


-patient and family met with [facility] this morning and they have decided 

to proceed with hospice care; they would like nursing home facility placement 

for discharge, as it is currently a holiday weekend it is not likely that the 

patient will be able to obtain placement before Tuesday


-will initiate comfort measures only, and stop all non-comfort measures 

medications, which was reviewed in detail with the family, who are in agreement





(2) Septic shock


Status:  Resolved


(3) Pneumonia involving left lung


Status:  Acute


Assessment & Plan:  18


-family has elected to proceed with hospice care


-will initiate hospice care in the hospital, as they desire placement in 

nursing home and this will likely not be accomplished until Tuesday; explained 

that this means we will stop all medications that are not providing patient 

comfort; family agreeable


Qualifiers:  


   Qualified Codes:  J18.1 - Lobar pneumonia, unspecified organism


(4) Mass of left lung


Status:  Acute


Assessment & Plan:  - Plan for broch during this hospitalization, Dr Yost 

consulted 





18


-pt not stable for bronch due to acute PE per CTA


-family has elected for hospice care


-patient made comfort measures only with plans for placement in nursing home 

for hospice care on Tuesday 





(5) Diabetes


Status:  Chronic


Assessment & Plan:  - A1c pending





18


-patient now comfort measures only, she may eat and drink as desired


-discontinue accuchecks and insulin


Qualifiers:  


   Qualified Codes:  E11.9 - Type 2 diabetes mellitus without complications


(6) Normocytic anemia


Status:  Chronic


Assessment & Plan:  - Patient transfused 1 units pRBCs








-Hgb 8.3, stable from yesterday


-patient now comfort measures only, will stop all lab draws





(7) HTN (hypertension)


Status:  Chronic


Assessment & Plan:  Holding home BP meds due to sepsis





18


-will stop meds as pt is now comfort measures only


Qualifiers:  


   Qualified Codes:  I10 - Essential (primary) hypertension


(8) Severe protein-calorie malnutrition


Status:  Chronic


(9) Elevated INR


Status:  Acute


Assessment & Plan:  - Given Vit K given elevated INR





(10) Wound of left leg


Status:  Acute


Qualifiers:  


   Qualified Codes:  S81.802A - Unspecified open wound, left lower leg, initial 

encounter


(11) Localized swelling of both lower extremities


Status:  Chronic


(12) Hypokalemia


Status:  Resolved


(13) Hypomagnesemia


Status:  Resolved





Clinical Quality Measures


DVT/VTE Risk/Contraindication:


Risk Factor Score Per Nursin


RFS Level Per Nursing on Admit:  4+=Very High





Copy


Copies To 1:   [facility]/TONIA CHACON DO Sep 1, 2018 09:59

## 2018-09-01 NOTE — PULMONARY PROGRESS NOTE
Subjective


Time Seen by Provider:  06:17


Subjective/Events-last exam


PT is still requiring BIPAP. Has had decreased UO.





Sepsis Event


Evaluation


Height, Weight, BMI


Height: 5'5.00"


Weight: 146lbs. 3.0oz. 66.132682wy; 21.5 BMI


Method:Stated





Exam


Exam





Vital Signs








  Date Time  Temp Pulse Resp B/P (MAP) Pulse Ox O2 Delivery O2 Flow Rate FiO2


 


9/1/18 06:00  61 18 100/64 (76) 100 NIV Bilevel 35.00 


 


9/1/18 05:00  71 21 115/89 (98) 100 NIV Bilevel 35.00 


 


9/1/18 04:29  71 23  100  35.00 


 


9/1/18 04:00  66 26 121/69 (86) 100 NIV Bilevel 35.00 


 


9/1/18 04:00     96 NIV Bilevel  35


 


9/1/18 03:00  66 17 111/69 (83) 92 NIV Bilevel 35.00 


 


9/1/18 02:42  68 20  93  35.00 


 


9/1/18 02:00  68 25 121/66 (84) 95 NIV Bilevel 35.00 


 


9/1/18 01:03 97.5       


 


9/1/18 01:00  67 22 92/55 (67) 100 NIV Bilevel 35.00 


 


9/1/18 01:00  67      


 


9/1/18 00:27      NIV Bilevel 35.00 


 


9/1/18 00:17  99 38  100  35.00 


 


9/1/18 00:00     96 Nasal Cannula 2.00 


 


9/1/18 00:00  92 28 150/90 (110) 94 High Flow N/C 2.00 


 


8/31/18 23:00  76 29 130/78 (95) 100 High Flow N/C 2.00 


 


8/31/18 23:00     97 Nasal Cannula 3.00 


 


8/31/18 22:00  78 27 128/92 (104) 97 High Flow N/C 2.50 


 


8/31/18 21:00  81 37 115/64 (81) 100 High Flow N/C 2.50 


 


8/31/18 20:00  81 34 114/62 (79) 100 High Flow N/C 2.50 


 


8/31/18 20:00     98 Nasal Cannula 2.50 


 


8/31/18 19:25 98.0 87 28 138/84 (102) 98 High Flow N/C 2.50 


 


8/31/18 19:03     97 Nasal Cannula 3.00 


 


8/31/18 19:00  88 29 138/84 (102) 96 High Flow N/C 3.00 


 


8/31/18 19:00  88      


 


8/31/18 18:00  77 30 91/50 (64) 98 High Flow N/C 3.00 


 


8/31/18 17:00  86 50 137/88 (104) 96 High Flow N/C 3.00 


 


8/31/18 16:00     96 Nasal Cannula 3.00 


 


8/31/18 16:00  81 42 131/94 (106) 97 High Flow N/C 3.00 


 


8/31/18 15:03     98 Nasal Cannula 3.00 


 


8/31/18 15:00  71 25 139/94 (109) 100 High Flow N/C 3.00 


 


8/31/18 14:00  70 28 108/73 (85) 99 High Flow N/C 3.00 


 


8/31/18 13:00  81      


 


8/31/18 13:00  81 35 118/77 (91) 97 High Flow N/C 3.00 


 


8/31/18 12:00     100 Nasal Cannula 3.00 


 


8/31/18 12:00  86 39 126/79 (95) 97 High Flow N/C 3.00 


 


8/31/18 11:30 97.2       


 


8/31/18 11:00  92 34 134/88 (103) 97 High Flow N/C 3.00 


 


8/31/18 10:47     97 Nasal Cannula 3.00 


 


8/31/18 10:00  87 36 126/89 (101) 99 High Flow N/C 3.00 


 


8/31/18 09:00  93 30 131/85 (100) 98 High Flow N/C 3.00 


 


8/31/18 08:06 97.9 86 24 124/79 (94) 100 High Flow N/C 3.00 


 


8/31/18 08:00     100 NIV Bilevel  40


 


8/31/18 07:00  84      


 


8/31/18 07:00  84 17 133/67 (89) 100 High Flow N/C 3.00 


 


8/31/18 06:45  80 16  100  35.00 














I & O 


 


 9/1/18





 06:59


 


Intake Total 0 ml


 


Output Total 375 ml


 


Balance -375 ml








Height & Weight


Height: 5'5.00"


Weight: 146lbs. 3.0oz. 66.651845mn; 21.5 BMI


Method:Stated


General Appearance:  Mild Distress, Thin


HEENT:  PERRL/EOMI, Pharynx Normal


Neck:  Non Tender, Supple


Respiratory:  No Respiratory Distress, Crackles


Cardiovascular:  No Murmur, Tachycardia


Capillary Refill:  Less Than 3 Seconds


Gastrointestinal:  non tender, soft, no organomegaly; No hepatomegaly, No 

spleenomegaly


Extremity:  Normal Capillary Refill, Pedal Edema (2+ to the mid tibia bilateral)


Neurologic/Psychiatric:  Alert, Motor Weakness (global)


Skin:  Normal Color, Warm/Dry


Lymphatic:  No Adenopathy





Results


Lab


Laboratory Tests


8/31/18 05:40








8/31/18 14:10








8/31/18 18:35








9/1/18 04:30











Assessment/Plan


Assessment/Plan


Severe Sepsis secondary to Pneumonia vs UTI - BC Staphylococcus 


   -Continue Vanco and Zosyn for now 


Acute respiratory distress through the night


   -BiPAP 


   -CTA is suggestive of PE 


      -Lovenox started 


Pulmonary edema 


   -Continue Lasix IV 40mg daily





Persistent leukocytosis - afebrial 


   -No steroids ordered 


   -Continue to monitor close 


Hx of hypothyroid with thyroidectomy 


   - Synthroid IV secondary to pt not being able to take PO 





Pulmonary edema


   -Lasix 


Anemia S/p 1 unit of PRBC 


Large left mediastinal lung mass. 


   - Bronch cancelled secondary to acute PE and pt is too unstable 


Left leg wound/decub ulcer 


   -Tissue trauma 


Malnutrition    


   -Monitor 


DM, HTN








Family is considering hospice care. Pt's prognosis is overall very poor. Will 

do bronchoscopy if family consents when pt is stable enough.











ADELITA CAGLE DO Sep 1, 2018 06:16

## 2018-09-02 RX ADMIN — SODIUM CHLORIDE, SODIUM LACTATE, POTASSIUM CHLORIDE, AND CALCIUM CHLORIDE SCH MLS/HR: 600; 310; 30; 20 INJECTION, SOLUTION INTRAVENOUS at 07:56

## 2018-09-02 RX ADMIN — MORPHINE SULFATE PRN MG: 4 INJECTION, SOLUTION INTRAMUSCULAR; INTRAVENOUS at 10:27

## 2018-09-02 RX ADMIN — PANTOPRAZOLE SODIUM SCH MG: 40 INJECTION, POWDER, FOR SOLUTION INTRAVENOUS at 08:05

## 2018-09-02 RX ADMIN — LORAZEPAM PRN MG: 2 INJECTION INTRAMUSCULAR; INTRAVENOUS at 07:56

## 2018-09-02 RX ADMIN — MORPHINE SULFATE PRN MG: 4 INJECTION, SOLUTION INTRAMUSCULAR; INTRAVENOUS at 14:31

## 2018-09-02 RX ADMIN — LORAZEPAM PRN MG: 2 INJECTION INTRAMUSCULAR; INTRAVENOUS at 13:29

## 2018-09-02 NOTE — PROGRESS NOTE (SOAP)
Subjective


Subjective/Events-last exam


Patient sleeping soundly at the time of exam.  Family member at bedside reports 

that patient has woken up very little since last night.  Per documentation, she 

received 1 mg of ativan last night, and one dose this morning.  Discussed with 

family member that it is definitely possible that patient may pass away while 

still in the hospital, and he states understanding.  Patient remains comfort 

measures only.


Review of Systems


Date Seen by Provider:  Sep 2, 2018


Time Seen by Provider:  11:15


General:  No Appetite


Pulmonary:  No Cough


Cardiovascular:  Edema





Objective


Exam


Last Set of Vital Signs





Vital Signs








  Date Time  Temp Pulse Resp B/P (MAP) Pulse Ox O2 Delivery O2 Flow Rate FiO2


 


18 07:45      Nasal Cannula 1.00 


 


18 20:00     100   


 


18 16:00  66 25 133/72 (92)    


 


18 12:31 98.7       


 


18 04:00        35





Capillary Refill : Less Than 3 SecondsLess Than 3 Seconds


I&O











Intake and Output 


 


 18





 00:00


 


Intake Total 960 ml


 


Output Total 570 ml


 


Balance 390 ml


 


 


 


Intake Oral 660 ml


 


IV Total 300 ml


 


Output Urine Total 570 ml


 


# Bowel Movements 1








General:  No Acute Distress, Other (extremely sedated, moves lower extremeties 

when examined but otherwise has no response)


HEENT:  Atraumatic, Mucous Memb Moist/Pink


Neck:  Supple


Lungs:  Other (diminished, shallow)


Heart:  Regular Rate, Normal S1, Normal S2


Abdomen:  Normal Bowel Sounds, Soft, No Tenderness


Extremities:  Other (generalized edema, worse in BLE than upper)


Skin:  Other (multiple wounds covered with clean, dry dressings)


Neuro:  Other (withdraws to cold hands when lower extremities are examined, but 

otherwise has no purposeful responses)





Results/Procedures


Lab


Laboratory Tests


18 12:07: Glucometer 188H





Microbiology


18 Blood Culture - Preliminary, Resulted


          Staph, Coag Neg (CNS)


18 MRSA Screen - Final, Complete


          MRSA not isolated


18 Urine Culture - Final, Complete


          NO GROWTH


Radiology


Date of Exam:   18





CT CHEST W


 





PROCEDURE: CT chest with contrast only.





TECHNIQUE: Multiple contiguous axial images were obtained through


the chest after administration of intravenous contrast.





INDICATION: Cough, fever





COMPARISON: There are no previous CT chest examinations available


for comparison.





FINDINGS:  


The plain film examination of the chest performed earlier today


at 1:23 PM noted a fullness in the left hilum raising the


question of a neoplastic mass in this area. On this exam, there


is indeed a sizable 5.4 x 6.6 x 6.1 CM heterogeneous mass along


the medial aspect of the left upper lung. This mass should be


considered neoplastic until proven otherwise.





The heart is mildly enlarged. There are coronary calcifications


evident. The aorta is not abnormally dilated and there is no sign


of dissection. The pulmonary arteries were not well opacified and


consequently difficult to assess for a pulmonary embolus. 





There is no mediastinal or hilar adenopathy. There are chronic


pulmonary changes evident and there are coarse interstitial


densities about both carol ann and in both lung bases, particularly on


the right.





There is no obvious breast mass.





The sections through the upper abdomen fail to show any sign of


an acute abnormality. There are surgical clips about the


gallbladder fossa consistent with a prior cholecystectomy. There


appears to be a small collection of fluid in this area. This may


be fluid within the bowel as opposed to a free fluid collection.





Also, there is a 1.3 x 1.4 CM nodule associated with the medial


jeanine of the right adrenal gland. This may represent a benign


process. The possibility that this is secondary to metastatic


disease secondary to the large lung mass cannot be entirely


excluded however. If further imaging is desired, PET CT would be


recommended.





The bone windows show no sign of a fracture or of a destructive


lesion.





IMPRESSION:


1. There is a large mass along the medial aspect of the left


upper lobe. This should be considered neoplastic until proven


otherwise.


2. There is cardiomegaly and coronary artery disease and chronic


pulmonary disease. There is no acute cardiopulmonary abnormality


noted otherwise.


3. The nodule associated with the right adrenal gland may


represent a benign process. The possibility that this is


neoplastic in nature cannot be entirely excluded. Recommendations


as above.


4. The fluid collection in the right upper quadrant may be fluid


within the bowel. If further study is desired, then a complete CT


abdomen and pelvis exam should be obtained.





Assessment/Plan


Assessment/Plan





(1) Comfort measures only status


Status:  Acute


Assessment & Plan:  


-patient and family met with [facility] this morning and they have decided 

to proceed with hospice care; they would like nursing home facility placement 

for discharge, as it is currently a holiday weekend it is not likely that the 

patient will be able to obtain placement before Tuesday


-will initiate comfort measures only, and stop all non-comfort measures 

medications, which was reviewed in detail with the family, who are in agreement





-continue with comfort measures only


-discussed with family member at bedside that patient may not survive until 

nursing home placement, which would be Tuesday at the earliest, and that it is 

a very real possibility that she may pass away while in the hospital


-family member states understanding and continues to enforce they want comfort 

measures only and that patient appears to be very comfortable at this time





(2) Septic shock


Status:  Resolved


(3) Pneumonia involving left lung


Status:  Acute


Assessment & Plan:  18


-family has elected to proceed with hospice care


-will initiate hospice care in the hospital, as they desire placement in 

nursing home and this will likely not be accomplished until Tuesday; explained 

that this means we will stop all medications that are not providing patient 

comfort; family agreeable


Qualifiers:  


   Qualified Codes:  J18.1 - Lobar pneumonia, unspecified organism


(4) Mass of left lung


Status:  Acute


Assessment & Plan:  - Plan for broch during this hospitalization, Dr Yost 

consulted 





18


-pt not stable for bronch due to acute PE per CTA


-family has elected for hospice care


-patient made comfort measures only with plans for placement in nursing home 

for hospice care on Tuesday 





(5) Diabetes


Status:  Chronic


Assessment & Plan:  - A1c pending





18


-patient now comfort measures only, she may eat and drink as desired


-discontinue accuchecks and insulin


Qualifiers:  


   Qualified Codes:  E11.9 - Type 2 diabetes mellitus without complications


(6) Normocytic anemia


Status:  Chronic


Assessment & Plan:  - Patient transfused 1 units pRBCs








-Hgb 8.3, stable from yesterday


-patient now comfort measures only, will stop all lab draws





(7) HTN (hypertension)


Status:  Chronic


Assessment & Plan:  Holding home BP meds due to sepsis





18


-will stop meds as pt is now comfort measures only


Qualifiers:  


   Qualified Codes:  I10 - Essential (primary) hypertension


(8) Severe protein-calorie malnutrition


Status:  Chronic


(9) Wound of left leg


Status:  Acute


Qualifiers:  


   Qualified Codes:  S81.802A - Unspecified open wound, left lower leg, initial 

encounter


(10) Localized swelling of both lower extremities


Status:  Chronic


(11) Hypokalemia


Status:  Resolved


(12) Hypomagnesemia


Status:  Resolved





Clinical Quality Measures


End of Life/Advance Care Plan:


Advance Care discuss with:  family member (s)


End of Life Care:  Comfort Measures, Hospice (Hospital) (pt comfort care in 

hospital with plans for nursing home placement and [facility] at discharge, 

if she is to survive until that can be arranged)





DVT/VTE Risk/Contraindication:


Risk Factor Score Per Nursin


RFS Level Per Nursing on Admit:  4+=Very High


Contraindications-Pharm:  Other *list below*


Other:  


patient is now comfort measures only





DVT/VTE Prophylaxis Comfirm.Dx


Mechanical not ordered:  Other (comfort measures only)





Urinary Catheter-Non SCIP Pts:


Reason for Catheter Continuanc:  Palliative(End of Life)





Copy


Copies To 1:   [facility]/TONIA CHACON DO Sep 2, 2018 09:03

## 2018-09-03 RX ADMIN — SODIUM CHLORIDE, SODIUM LACTATE, POTASSIUM CHLORIDE, AND CALCIUM CHLORIDE SCH MLS/HR: 600; 310; 30; 20 INJECTION, SOLUTION INTRAVENOUS at 06:06

## 2018-09-03 RX ADMIN — MORPHINE SULFATE PRN MG: 4 INJECTION, SOLUTION INTRAMUSCULAR; INTRAVENOUS at 08:11

## 2018-09-03 RX ADMIN — MORPHINE SULFATE PRN MG: 4 INJECTION, SOLUTION INTRAMUSCULAR; INTRAVENOUS at 13:49

## 2018-09-03 RX ADMIN — LORAZEPAM PRN MG: 2 INJECTION INTRAMUSCULAR; INTRAVENOUS at 10:53

## 2018-09-03 RX ADMIN — PANTOPRAZOLE SODIUM SCH MG: 40 INJECTION, POWDER, FOR SOLUTION INTRAVENOUS at 08:07

## 2018-09-03 NOTE — PULMONARY PROGRESS NOTE
Subjective


Time Seen by Provider:  07:15


Subjective/Events-last exam


pt appears comfortable





Sepsis Event


Evaluation


Height, Weight, BMI


Height: 5'5.00"


Weight: 148lbs. 3.0oz. [id number]; 21.5 BMI


Method:Stated





Exam


Exam





Vital Signs








  Date Time  Temp Pulse Resp B/P (MAP) Pulse Ox O2 Delivery O2 Flow Rate FiO2


 


9/2/18 20:00     100 Nasal Cannula 1.00 


 


9/2/18 09:00     100 Nasal Cannula 1.00 


 


9/2/18 07:45      Nasal Cannula 1.00 














I & O 


 


 9/3/18





 07:00


 


Intake Total 0 ml


 


Output Total 325 ml


 


Balance -325 ml








Height & Weight


Height: 5'5.00"


Weight: 148lbs. 3.0oz. 67.490741pe; 21.5 BMI


Method:Stated


General Appearance:  Mild Distress, Thin


HEENT:  PERRL/EOMI, Pharynx Normal


Neck:  Non Tender, Supple


Respiratory:  No Respiratory Distress, Crackles


Cardiovascular:  No Murmur, Tachycardia


Capillary Refill:  Less Than 3 Seconds


Gastrointestinal:  non tender, soft, no organomegaly; No hepatomegaly, No 

spleenomegaly


Extremity:  Normal Capillary Refill, Pedal Edema (2+ to the mid tibia bilateral)


Neurologic/Psychiatric:  Alert, Motor Weakness (global)


Skin:  Normal Color, Warm/Dry


Lymphatic:  No Adenopathy





Assessment/Plan


Assessment/Plan


Severe Sepsis secondary to Pneumonia vs UTI - BC Staphylococcus 





Acute respiratory distress through the night


   


Pulmonary edema 


   





Persistent leukocytosis - afebrial 


   


Hx of hypothyroid with thyroidectomy 


Pulmonary edema





Anemia S/p 1 unit of PRBC 


Large left mediastinal lung mass. 


Left leg wound/decub ulcer 


   -Tissue trauma 


Malnutrition    


   -Monitor 


DM, HTN








pt is comfort care only now. I am going to sign off please call with any 

questions or concerns.











ADELITA CAGLE DO Sep 3, 2018 05:37

## 2018-09-04 RX ADMIN — MORPHINE SULFATE PRN MG: 4 INJECTION, SOLUTION INTRAMUSCULAR; INTRAVENOUS at 23:39

## 2018-09-04 RX ADMIN — PANTOPRAZOLE SODIUM SCH MG: 40 INJECTION, POWDER, FOR SOLUTION INTRAVENOUS at 09:24

## 2018-09-04 RX ADMIN — LORAZEPAM PRN MG: 2 INJECTION INTRAMUSCULAR; INTRAVENOUS at 13:57

## 2018-09-04 RX ADMIN — MORPHINE SULFATE PRN MG: 4 INJECTION, SOLUTION INTRAMUSCULAR; INTRAVENOUS at 19:20

## 2018-09-04 RX ADMIN — MORPHINE SULFATE PRN MG: 4 INJECTION, SOLUTION INTRAMUSCULAR; INTRAVENOUS at 09:32

## 2018-09-04 RX ADMIN — SODIUM CHLORIDE, SODIUM LACTATE, POTASSIUM CHLORIDE, AND CALCIUM CHLORIDE SCH MLS/HR: 600; 310; 30; 20 INJECTION, SOLUTION INTRAVENOUS at 04:50

## 2018-09-04 RX ADMIN — LORAZEPAM PRN MG: 2 INJECTION INTRAMUSCULAR; INTRAVENOUS at 21:24

## 2018-09-04 RX ADMIN — MORPHINE SULFATE PRN MG: 4 INJECTION, SOLUTION INTRAMUSCULAR; INTRAVENOUS at 12:56

## 2018-09-04 RX ADMIN — LORAZEPAM PRN MG: 2 INJECTION INTRAMUSCULAR; INTRAVENOUS at 11:14

## 2018-09-04 RX ADMIN — LEVOTHYROXINE SODIUM ANHYDROUS SCH MCG: 100 INJECTION, POWDER, LYOPHILIZED, FOR SOLUTION INTRAVENOUS at 11:42

## 2018-09-04 RX ADMIN — SCOPALAMINE SCH MG: 1 PATCH, EXTENDED RELEASE TRANSDERMAL at 16:19

## 2018-09-04 RX ADMIN — LORAZEPAM PRN MG: 2 INJECTION INTRAMUSCULAR; INTRAVENOUS at 00:25

## 2018-09-04 NOTE — PROGRESS NOTE (SOAP)
Subjective


Subjective/Events-last exam


Social work notes indicate that family has requested patient attempt to be 

transferred to [facility], and her information has been faxed 

for their review.  Patient remains somnolent, although is minimally responsive 

to tactile stimuli today.  Nursing staff reports increasing periods of apnea.  

No family present at the time of exam.


Review of Systems


Date Seen by Provider:  Sep 4, 2018


Time Seen by Provider:  12:06


unobtainable, patient unresponsive





Objective


Exam


Last Set of Vital Signs





Vital Signs








  Date Time  Temp Pulse Resp B/P (MAP) Pulse Ox O2 Delivery O2 Flow Rate FiO2


 


18 08:45      Nasal Cannula 1.00 


 


9/3/18 08:00     100   


 


18 16:00  66 25 133/72 (92)    


 


18 12:31 98.7       


 


18 04:00        35





Capillary Refill : Less Than 3 SecondsLess Than 3 Seconds


I&O











Intake and Output 


 


 18





 00:00


 


Intake Total 0 ml


 


Output Total 425 ml


 


Balance -425 ml


 


 


 


Intake Oral 0 ml


 


Output Urine Total 425 ml


 


# Bowel Movements 1








General:  No Acute Distress, Other (sleeping, shallow respirations, minimal 

response to tactile stimulation)


HEENT:  Atraumatic


Neck:  Supple


Lungs:  Other (coarse bilaterally, diminished)


Heart:  Regular Rate, Normal S1, Normal S2


Abdomen:  Soft, No Tenderness, No Masses


Extremities:  Other (edema in extremities x4)


Skin:  Other (multiple skin tears covered with clean, dry bandages)


Neuro:  Other (minimally responsive to tactile stimulation only)





Results/Procedures


Lab





Microbiology


18 Blood Culture - Final, Complete


          Staph, Coag Neg (CNS)


18 MRSA Screen - Final, Complete


          MRSA not isolated


18 Urine Culture - Final, Complete


          NO GROWTH


Radiology


Date of Exam:   18





CT CHEST W


 





PROCEDURE: CT chest with contrast only.





TECHNIQUE: Multiple contiguous axial images were obtained through


the chest after administration of intravenous contrast.





INDICATION: Cough, fever





COMPARISON: There are no previous CT chest examinations available


for comparison.





FINDINGS:  


The plain film examination of the chest performed earlier today


at 1:23 PM noted a fullness in the left hilum raising the


question of a neoplastic mass in this area. On this exam, there


is indeed a sizable 5.4 x 6.6 x 6.1 CM heterogeneous mass along


the medial aspect of the left upper lung. This mass should be


considered neoplastic until proven otherwise.





The heart is mildly enlarged. There are coronary calcifications


evident. The aorta is not abnormally dilated and there is no sign


of dissection. The pulmonary arteries were not well opacified and


consequently difficult to assess for a pulmonary embolus. 





There is no mediastinal or hilar adenopathy. There are chronic


pulmonary changes evident and there are coarse interstitial


densities about both carol ann and in both lung bases, particularly on


the right.





There is no obvious breast mass.





The sections through the upper abdomen fail to show any sign of


an acute abnormality. There are surgical clips about the


gallbladder fossa consistent with a prior cholecystectomy. There


appears to be a small collection of fluid in this area. This may


be fluid within the bowel as opposed to a free fluid collection.





Also, there is a 1.3 x 1.4 CM nodule associated with the medial


jeanine of the right adrenal gland. This may represent a benign


process. The possibility that this is secondary to metastatic


disease secondary to the large lung mass cannot be entirely


excluded however. If further imaging is desired, PET CT would be


recommended.





The bone windows show no sign of a fracture or of a destructive


lesion.





IMPRESSION:


1. There is a large mass along the medial aspect of the left


upper lobe. This should be considered neoplastic until proven


otherwise.


2. There is cardiomegaly and coronary artery disease and chronic


pulmonary disease. There is no acute cardiopulmonary abnormality


noted otherwise.


3. The nodule associated with the right adrenal gland may


represent a benign process. The possibility that this is


neoplastic in nature cannot be entirely excluded. Recommendations


as above.


4. The fluid collection in the right upper quadrant may be fluid


within the bowel. If further study is desired, then a complete CT


abdomen and pelvis exam should be obtained.





Assessment/Plan


Assessment/Plan





(1) Comfort measures only status


Status:  Acute


Assessment & Plan:  


-patient and family met with [facility] this morning and they have decided 

to proceed with hospice care; they would like nursing home facility placement 

for discharge, as it is currently a holiday weekend it is not likely that the 

patient will be able to obtain placement before Tuesday


-will initiate comfort measures only, and stop all non-comfort measures 

medications, which was reviewed in detail with the family, who are in agreement





-continue with comfort measures only


-discussed with family member at bedside that patient may not survive until 

nursing home placement, which would be Tuesday at the earliest, and that it is 

a very real possibility that she may pass away while in the hospital


-family member states understanding and continues to enforce they want comfort 

measures only and that patient appears to be very comfortable at this time


9/3


-patient remains comfort measures only, appears comfortable with current 

medications and is not having any response to stimuli today


-again reinforced with family member that she may  prior to transport to 

nursing home, or en route, and depending on how things go overnight, it may be 

that allowing patient to pass away in the hospital is a better option


-will reassess in AM, encouraged family to notify staff if they feel that 

patient appears uncomfortable





-patient continues on comfort measures only


-increasing periods of apnea have been noted by nursing staff


-social work is pursuing possible transfer to nursing facility; it has 

previously been discussed with family that there is a possibility that patient 

may  during transport, no family is present today at the time of exam





(2) Septic shock


Status:  Resolved


(3) Pneumonia involving left lung


Status:  Acute


Assessment & Plan:  18


-family has elected to proceed with hospice care


-will initiate hospice care in the hospital, as they desire placement in 

nursing home and this will likely not be accomplished until Tuesday; explained 

that this means we will stop all medications that are not providing patient 

comfort; family agreeable


Qualifiers:  


   Qualified Codes:  J18.1 - Lobar pneumonia, unspecified organism


(4) Mass of left lung


Status:  Acute


Assessment & Plan:  - Plan for broch during this hospitalization, Dr Yost 

consulted 





18


-pt not stable for bronch due to acute PE per CTA


-family has elected for hospice care


-patient made comfort measures only with plans for placement in nursing home 

for hospice care on Tuesday 





(5) Diabetes


Status:  Chronic


Assessment & Plan:  - A1c pending





18


-patient now comfort measures only, she may eat and drink as desired


-discontinue accuchecks and insulin


Qualifiers:  


   Qualified Codes:  E11.9 - Type 2 diabetes mellitus without complications


(6) Normocytic anemia


Status:  Chronic


Assessment & Plan:  - Patient transfused 1 units pRBCs








-Hgb 8.3, stable from yesterday


-patient now comfort measures only, will stop all lab draws





(7) HTN (hypertension)


Status:  Chronic


Assessment & Plan:  Holding home BP meds due to sepsis





18


-will stop meds as pt is now comfort measures only


Qualifiers:  


   Qualified Codes:  I10 - Essential (primary) hypertension


(8) Severe protein-calorie malnutrition


Status:  Chronic


(9) Wound of left leg


Status:  Acute


Qualifiers:  


   Qualified Codes:  S81.802A - Unspecified open wound, left lower leg, initial 

encounter


(10) Localized swelling of both lower extremities


Status:  Chronic


(11) Hypokalemia


Status:  Resolved


(12) Hypomagnesemia


Status:  Resolved





Clinical Quality Measures


DVT/VTE Risk/Contraindication:


Risk Factor Score Per Nursin


RFS Level Per Nursing on Admit:  4+=Very High


Contraindications-Pharm:  Other *list below*


Other:  


patient is now comfort measures only





DVT/VTE Prophylaxis Comfirm.Dx


Mechanical not ordered:  Other (comfort measures only)





Urinary Catheter-Non SCIP Pts:


Reason for Catheter Continuanc:  Palliative(End of Life)











TONIA MIMS DO Sep 4, 2018 14:53

## 2018-09-04 NOTE — PROGRESS NOTE (SOAP)
Subjective


Subjective/Events-last exam


Patient resting comfortably.  Family at bedside reports that patient has not 

woken up or responded to anything in over 24 hours.


Review of Systems


Date Seen by Provider:  Sep 3, 2018


Time Seen by Provider:  17:15


Pulmonary:  Other (shallow respirations)


Gastrointestinal:  No: Vomiting


Neurological:  Other (somnolent and unresponsive)





Objective


Exam


Last Set of Vital Signs





Vital Signs








  Date Time  Temp Pulse Resp B/P (MAP) Pulse Ox O2 Delivery O2 Flow Rate FiO2


 


18 08:45      Nasal Cannula 1.00 


 


9/3/18 08:00     100   


 


18 16:00  66 25 133/72 (92)    


 


18 12:31 98.7       


 


18 04:00        35





Capillary Refill : Less Than 3 SecondsLess Than 3 Seconds


I&O











Intake and Output 


 


 18





 00:00


 


Intake Total 0 ml


 


Output Total 425 ml


 


Balance -425 ml


 


 


 


Intake Oral 0 ml


 


Output Urine Total 425 ml


 


# Bowel Movements 1








General:  No Acute Distress, Other (sleeping peacefully, no response to verbal 

or tactile stimuli)


HEENT:  Atraumatic


Neck:  Supple


Lungs:  Other (coarse lung sounds bilaterally)


Heart:  Regular Rate, Normal S1, Normal S2


Abdomen:  Soft, No Tenderness, No Masses


Extremities:  Other (edema in extremities x4)


Skin:  Other (multiple skin wounds, covered in clean bandages)


Neuro:  Other (no response to tactile or verbal stimulation)





Results/Procedures


Lab





Microbiology


18 Blood Culture - Final, Complete


          Staph, Coag Neg (CNS)


18 MRSA Screen - Final, Complete


          MRSA not isolated


18 Urine Culture - Final, Complete


          NO GROWTH


Radiology


Date of Exam:   18





CT CHEST W


 





PROCEDURE: CT chest with contrast only.





TECHNIQUE: Multiple contiguous axial images were obtained through


the chest after administration of intravenous contrast.





INDICATION: Cough, fever





COMPARISON: There are no previous CT chest examinations available


for comparison.





FINDINGS:  


The plain film examination of the chest performed earlier today


at 1:23 PM noted a fullness in the left hilum raising the


question of a neoplastic mass in this area. On this exam, there


is indeed a sizable 5.4 x 6.6 x 6.1 CM heterogeneous mass along


the medial aspect of the left upper lung. This mass should be


considered neoplastic until proven otherwise.





The heart is mildly enlarged. There are coronary calcifications


evident. The aorta is not abnormally dilated and there is no sign


of dissection. The pulmonary arteries were not well opacified and


consequently difficult to assess for a pulmonary embolus. 





There is no mediastinal or hilar adenopathy. There are chronic


pulmonary changes evident and there are coarse interstitial


densities about both carol ann and in both lung bases, particularly on


the right.





There is no obvious breast mass.





The sections through the upper abdomen fail to show any sign of


an acute abnormality. There are surgical clips about the


gallbladder fossa consistent with a prior cholecystectomy. There


appears to be a small collection of fluid in this area. This may


be fluid within the bowel as opposed to a free fluid collection.





Also, there is a 1.3 x 1.4 CM nodule associated with the medial


jeanine of the right adrenal gland. This may represent a benign


process. The possibility that this is secondary to metastatic


disease secondary to the large lung mass cannot be entirely


excluded however. If further imaging is desired, PET CT would be


recommended.





The bone windows show no sign of a fracture or of a destructive


lesion.





IMPRESSION:


1. There is a large mass along the medial aspect of the left


upper lobe. This should be considered neoplastic until proven


otherwise.


2. There is cardiomegaly and coronary artery disease and chronic


pulmonary disease. There is no acute cardiopulmonary abnormality


noted otherwise.


3. The nodule associated with the right adrenal gland may


represent a benign process. The possibility that this is


neoplastic in nature cannot be entirely excluded. Recommendations


as above.


4. The fluid collection in the right upper quadrant may be fluid


within the bowel. If further study is desired, then a complete CT


abdomen and pelvis exam should be obtained.





Assessment/Plan


Assessment/Plan





(1) Comfort measures only status


Status:  Acute


Assessment & Plan:  


-patient and family met with [facility] this morning and they have decided 

to proceed with hospice care; they would like nursing home facility placement 

for discharge, as it is currently a holiday weekend it is not likely that the 

patient will be able to obtain placement before Tuesday


-will initiate comfort measures only, and stop all non-comfort measures 

medications, which was reviewed in detail with the family, who are in agreement





-continue with comfort measures only


-discussed with family member at bedside that patient may not survive until 

nursing home placement, which would be Tuesday at the earliest, and that it is 

a very real possibility that she may pass away while in the hospital


-family member states understanding and continues to enforce they want comfort 

measures only and that patient appears to be very comfortable at this time


9/3


-patient remains comfort measures only, appears comfortable with current 

medications and is not having any response to stimuli today


-again reinforced with family member that she may  prior to transport to 

nursing home, or en route, and depending on how things go overnight, it may be 

that allowing patient to pass away in the hospital is a better option


-will reassess in AM, encouraged family to notify staff if they feel that 

patient appears uncomfortable





(2) Septic shock


Status:  Resolved


(3) Pneumonia involving left lung


Status:  Acute


Assessment & Plan:  18


-family has elected to proceed with hospice care


-will initiate hospice care in the hospital, as they desire placement in 

nursing home and this will likely not be accomplished until Tuesday; explained 

that this means we will stop all medications that are not providing patient 

comfort; family agreeable


Qualifiers:  


   Qualified Codes:  J18.1 - Lobar pneumonia, unspecified organism


(4) Mass of left lung


Status:  Acute


Assessment & Plan:  - Plan for broch during this hospitalization, Dr Yost 

consulted 





18


-pt not stable for bronch due to acute PE per CTA


-family has elected for hospice care


-patient made comfort measures only with plans for placement in nursing home 

for hospice care on Tuesday 





(5) Diabetes


Status:  Chronic


Assessment & Plan:  - A1c pending





18


-patient now comfort measures only, she may eat and drink as desired


-discontinue accuchecks and insulin


Qualifiers:  


   Qualified Codes:  E11.9 - Type 2 diabetes mellitus without complications


(6) Normocytic anemia


Status:  Chronic


Assessment & Plan:  - Patient transfused 1 units pRBCs








-Hgb 8.3, stable from yesterday


-patient now comfort measures only, will stop all lab draws





(7) HTN (hypertension)


Status:  Chronic


Assessment & Plan:  Holding home BP meds due to sepsis





18


-will stop meds as pt is now comfort measures only


Qualifiers:  


   Qualified Codes:  I10 - Essential (primary) hypertension


(8) Severe protein-calorie malnutrition


Status:  Chronic


(9) Wound of left leg


Status:  Acute


Qualifiers:  


   Qualified Codes:  S81.802A - Unspecified open wound, left lower leg, initial 

encounter


(10) Localized swelling of both lower extremities


Status:  Chronic


(11) Hypokalemia


Status:  Resolved


(12) Hypomagnesemia


Status:  Resolved





Clinical Quality Measures


DVT/VTE Risk/Contraindication:


Risk Factor Score Per Nursin


RFS Level Per Nursing on Admit:  4+=Very High


Contraindications-Pharm:  Other *list below*


Other:  


patient is now comfort measures only





DVT/VTE Prophylaxis Comfirm.Dx


Mechanical not ordered:  Other (comfort measures only)





Urinary Catheter-Non SCIP Pts:


Reason for Catheter Continuanc:  Palliative(End of Life)











TONIA MIMS DO Sep 4, 2018 14:40

## 2018-09-05 RX ADMIN — MORPHINE SULFATE PRN MG: 4 INJECTION, SOLUTION INTRAMUSCULAR; INTRAVENOUS at 05:43

## 2018-09-05 RX ADMIN — SCOPALAMINE SCH MG: 1 PATCH, EXTENDED RELEASE TRANSDERMAL at 02:47

## 2018-09-05 RX ADMIN — LORAZEPAM PRN MG: 2 INJECTION INTRAMUSCULAR; INTRAVENOUS at 00:37

## 2018-09-05 RX ADMIN — MORPHINE SULFATE PRN MG: 4 INJECTION, SOLUTION INTRAMUSCULAR; INTRAVENOUS at 01:04

## 2018-09-05 RX ADMIN — MORPHINE SULFATE PRN MG: 4 INJECTION, SOLUTION INTRAMUSCULAR; INTRAVENOUS at 03:19

## 2018-09-30 NOTE — DISCHARGE SUMMARY
Diagnosis/Chief Complaint


Date of Admission


Aug 27, 2018 at 15:35


Date of Discharge


Sep 5, 2018 at 09:30


Admission Diagnosis


Admission Diagnosis


(1) Septic shock


Status:  Acute


Assessment & Plan:  - Patient has completed 30 mg/kg fluids, VS stable, LA 

resolved


- Started on Vanc/zosyn, blood culture pending





(2) Pneumonia involving left lung


Status:  Acute


Assessment & Plan:  - Likely post obstructive 2/2 mass


- Will consult Ritika to see if patient is candidate for broch vs 

Interventional Rad for bx


Qualifiers:  


   Qualified Codes:  J18.1 - Lobar pneumonia, unspecified organism


(3) Mass of left lung


Status:  Acute


(4) Wound of left leg


Status:  Acute


Assessment & Plan:  - Consult wound care


- Concerned for poor vascular supply


Qualifiers:  


   Qualified Codes:  S81.802A - Unspecified open wound, left lower leg, initial 

encounter


(5) Localized swelling of both lower extremities


Status:  Chronic


(6) Decubital ulcer


Status:  Chronic


Assessment & Plan:  - Wound care


Qualifiers:  


   Qualified Codes:  L89.159 - Pressure ulcer of sacral region, unspecified 

stage


(7) Diabetes


Status:  Chronic


Assessment & Plan:  - SSI, A1c pending


Qualifiers:  


   Qualified Codes:  E11.9 - Type 2 diabetes mellitus without complications


(8) HTN (hypertension)


Status:  Chronic


Assessment & Plan:  - Will hold bp meds due to shock at this time, will 

continue to monitor


Qualifiers:  


   Qualified Codes:  I10 - Essential (primary) hypertension


(9) Severe protein-calorie malnutrition


Status:  Chronic


(10) Elevated INR


Status:  Acute


Assessment & Plan:  - will repeat in AM





(11) Elevated bilirubin


Status:  Acute


Assessment & Plan:  - Repeat in AM





(12) Normocytic anemia


Status:  Chronic


Assessment & Plan:  - Will add iron panel, repeat in AM





(13) Hypokalemia


Status:  Acute


Assessment & Plan:  - Replace and repeat





(14) Hypomagnesemia


Status:  Acute


Assessment & Plan:  - Replace and repeat in AM





Discharge Diagnosis


(1) Comfort measures only status


Status:  Acute


Assessment & Plan:  


-patient and family met with [facility] this morning and they have decided 

to proceed with hospice care; they would like nursing home facility placement 

for discharge, as it is currently a holiday weekend it is not likely that the 

patient will be able to obtain placement before Tuesday


-will initiate comfort measures only, and stop all non-comfort measures 

medications, which was reviewed in detail with the family, who are in agreement





-continue with comfort measures only


-discussed with family member at bedside that patient may not survive until 

nursing home placement, which would be Tuesday at the earliest, and that it is 

a very real possibility that she may pass away while in the hospital


-family member states understanding and continues to enforce they want comfort 

measures only and that patient appears to be very comfortable at this time


9/3


-patient remains comfort measures only, appears comfortable with current 

medications and is not having any response to stimuli today


-again reinforced with family member that she may  prior to transport to 

nursing home, or en route, and depending on how things go overnight, it may be 

that allowing patient to pass away in the hospital is a better option


-will reassess in AM, encouraged family to notify staff if they feel that 

patient appears uncomfortable





-patient continues on comfort measures only


-increasing periods of apnea have been noted by nursing staff


-social work is pursuing possible transfer to nursing facility; it has 

previously been discussed with family that there is a possibility that patient 

may  during transport, no family is present today at the time of exam





(2) Septic shock


Status:  Resolved


(3) Pneumonia involving left lung


Status:  Acute


Assessment & Plan:  18


-family has elected to proceed with hospice care


-will initiate hospice care in the hospital, as they desire placement in 

nursing home and this will likely not be accomplished until Tuesday; explained 

that this means we will stop all medications that are not providing patient 

comfort; family agreeable


Qualifiers:  


   Qualified Codes:  J18.1 - Lobar pneumonia, unspecified organism


(4) Mass of left lung


Status:  Acute


Assessment & Plan:  - Plan for broch during this hospitalization, Dr Yost 

consulted 





18


-pt not stable for bronch due to acute PE per CTA


-family has elected for hospice care


-patient made comfort measures only with plans for placement in nursing home 

for hospice care on Tuesday 





(5) Diabetes


Status:  Chronic


Assessment & Plan:  - A1c pending





18


-patient now comfort measures only, she may eat and drink as desired


-discontinue accuchecks and insulin


Qualifiers:  


   Qualified Codes:  E11.9 - Type 2 diabetes mellitus without complications


(6) Normocytic anemia


Status:  Chronic


Assessment & Plan:  - Patient transfused 1 units pRBCs








-Hgb 8.3, stable from yesterday


-patient now comfort measures only, will stop all lab draws





(7) HTN (hypertension)


Status:  Chronic


Assessment & Plan:  Holding home BP meds due to sepsis





18


-will stop meds as pt is now comfort measures only


Qualifiers:  


   Qualified Codes:  I10 - Essential (primary) hypertension


(8) Severe protein-calorie malnutrition


Status:  Chronic


(9) Wound of left leg


Status:  Acute


Qualifiers:  


   Qualified Codes:  S81.802A - Unspecified open wound, left lower leg, initial 

encounter


(10) Localized swelling of both lower extremities


Status:  Chronic


(11) Hypokalemia


Status:  Resolved


(12) Hypomagnesemia


Status:  Resolved





Chief Complaint/HPI


Chief Complaint/HPI


84 yo F that presented to ER with a week of not feeling good. States that she 

had fatigue for the last 2 months with weight loss of 20#. States that she had 

chills and night sweats during this time. Denies any pain other then chronic 

pain in her low back. Couple day history of shortness of breath.





Patient and grandson are poor historians





Discharge Summary-Simple/Stand


Consultations





Discharge Physical Examination


Allergies:  


Coded Allergies:  


     No Known Drug Allergies (Unverified , 18)





Hospital Course


See final discharge diagnosis.


Radiology Reviewed


Date of Exam:   18





CT CHEST W


 





PROCEDURE: CT chest with contrast only.





TECHNIQUE: Multiple contiguous axial images were obtained through


the chest after administration of intravenous contrast.





INDICATION: Cough, fever





COMPARISON: There are no previous CT chest examinations available


for comparison.





FINDINGS:  


The plain film examination of the chest performed earlier today


at 1:23 PM noted a fullness in the left hilum raising the


question of a neoplastic mass in this area. On this exam, there


is indeed a sizable 5.4 x 6.6 x 6.1 CM heterogeneous mass along


the medial aspect of the left upper lung. This mass should be


considered neoplastic until proven otherwise.





The heart is mildly enlarged. There are coronary calcifications


evident. The aorta is not abnormally dilated and there is no sign


of dissection. The pulmonary arteries were not well opacified and


consequently difficult to assess for a pulmonary embolus. 





There is no mediastinal or hilar adenopathy. There are chronic


pulmonary changes evident and there are coarse interstitial


densities about both carol ann and in both lung bases, particularly on


the right.





There is no obvious breast mass.





The sections through the upper abdomen fail to show any sign of


an acute abnormality. There are surgical clips about the


gallbladder fossa consistent with a prior cholecystectomy. There


appears to be a small collection of fluid in this area. This may


be fluid within the bowel as opposed to a free fluid collection.





Also, there is a 1.3 x 1.4 CM nodule associated with the medial


jeanine of the right adrenal gland. This may represent a benign


process. The possibility that this is secondary to metastatic


disease secondary to the large lung mass cannot be entirely


excluded however. If further imaging is desired, PET CT would be


recommended.





The bone windows show no sign of a fracture or of a destructive


lesion.





IMPRESSION:


1. There is a large mass along the medial aspect of the left


upper lobe. This should be considered neoplastic until proven


otherwise.


2. There is cardiomegaly and coronary artery disease and chronic


pulmonary disease. There is no acute cardiopulmonary abnormality


noted otherwise.


3. The nodule associated with the right adrenal gland may


represent a benign process. The possibility that this is


neoplastic in nature cannot be entirely excluded. Recommendations


as above.


4. The fluid collection in the right upper quadrant may be fluid


within the bowel. If further study is desired, then a complete CT


abdomen and pelvis exam should be obtained.





Discharge


Condition at discharge





Instructions to patient/family


Please see electronic discharge instructions given to patient.


Discharge Medications


Reviewed and agree with Discharge Medication list on patient's Discharge 

Instruction sheet





Clinical Quality Measures


DVT/VTE Risk/Contraindication:


Risk Factor Score Per Nursin


RFS Level Per Nursing on Admit:  4+=Very High


Contraindications-Pharm:  Other *list below*


Other:  


patient is now comfort measures only





DVT/VTE Prophylaxis Comfirm.Dx


Mechanical not ordered:  Other (comfort measures only)





Urinary Catheter-Non SCIP Pts:


Reason for Catheter Continuanc:  Palliative(End of Life)





Comfort Measures/


Type of Care:  Hospice (Hospital)


Date of Death:  Sep 5, 2018


Time of Death:  06:30











TONIA MIMS DO Sep 30, 2018 19:46